# Patient Record
Sex: MALE | Race: WHITE | NOT HISPANIC OR LATINO | Employment: OTHER | URBAN - METROPOLITAN AREA
[De-identification: names, ages, dates, MRNs, and addresses within clinical notes are randomized per-mention and may not be internally consistent; named-entity substitution may affect disease eponyms.]

---

## 2021-08-11 ENCOUNTER — APPOINTMENT (OUTPATIENT)
Dept: GENERAL RADIOLOGY | Facility: CLINIC | Age: 41
End: 2021-08-11
Attending: EMERGENCY MEDICINE
Payer: MEDICARE

## 2021-08-11 ENCOUNTER — HOSPITAL ENCOUNTER (EMERGENCY)
Facility: CLINIC | Age: 41
Discharge: HOME | End: 2021-08-11
Attending: EMERGENCY MEDICINE | Admitting: EMERGENCY MEDICINE
Payer: MEDICARE

## 2021-08-11 ENCOUNTER — NURSE TRIAGE (OUTPATIENT)
Dept: NURSING | Facility: CLINIC | Age: 41
End: 2021-08-11

## 2021-08-11 VITALS
HEART RATE: 77 BPM | WEIGHT: 175 LBS | RESPIRATION RATE: 20 BRPM | SYSTOLIC BLOOD PRESSURE: 126 MMHG | TEMPERATURE: 97.9 F | DIASTOLIC BLOOD PRESSURE: 89 MMHG | OXYGEN SATURATION: 97 %

## 2021-08-11 DIAGNOSIS — I48.91 ATRIAL FIBRILLATION WITH RAPID VENTRICULAR RESPONSE (H): ICD-10-CM

## 2021-08-11 LAB
ANION GAP SERPL CALCULATED.3IONS-SCNC: 7 MMOL/L (ref 3–14)
ATRIAL RATE - MUSE: 64 BPM
ATRIAL RATE - MUSE: 73 BPM
BASOPHILS # BLD AUTO: 0.1 10E3/UL (ref 0–0.2)
BASOPHILS NFR BLD AUTO: 1 %
BUN SERPL-MCNC: 14 MG/DL (ref 7–30)
CALCIUM SERPL-MCNC: 9.4 MG/DL (ref 8.5–10.1)
CHLORIDE BLD-SCNC: 105 MMOL/L (ref 94–109)
CO2 SERPL-SCNC: 24 MMOL/L (ref 20–32)
CREAT SERPL-MCNC: 0.96 MG/DL (ref 0.66–1.25)
DIASTOLIC BLOOD PRESSURE - MUSE: NORMAL MMHG
DIASTOLIC BLOOD PRESSURE - MUSE: NORMAL MMHG
EOSINOPHIL # BLD AUTO: 0.1 10E3/UL (ref 0–0.7)
EOSINOPHIL NFR BLD AUTO: 1 %
ERYTHROCYTE [DISTWIDTH] IN BLOOD BY AUTOMATED COUNT: 12.3 % (ref 10–15)
GFR SERPL CREATININE-BSD FRML MDRD: >90 ML/MIN/1.73M2
GLUCOSE BLD-MCNC: 79 MG/DL (ref 70–99)
HCT VFR BLD AUTO: 49.6 % (ref 40–53)
HGB BLD-MCNC: 16.5 G/DL (ref 13.3–17.7)
IMM GRANULOCYTES # BLD: 0 10E3/UL
IMM GRANULOCYTES NFR BLD: 0 %
INTERPRETATION ECG - MUSE: NORMAL
INTERPRETATION ECG - MUSE: NORMAL
LYMPHOCYTES # BLD AUTO: 2.3 10E3/UL (ref 0.8–5.3)
LYMPHOCYTES NFR BLD AUTO: 25 %
MCH RBC QN AUTO: 32.1 PG (ref 26.5–33)
MCHC RBC AUTO-ENTMCNC: 33.3 G/DL (ref 31.5–36.5)
MCV RBC AUTO: 97 FL (ref 78–100)
MONOCYTES # BLD AUTO: 0.7 10E3/UL (ref 0–1.3)
MONOCYTES NFR BLD AUTO: 8 %
NEUTROPHILS # BLD AUTO: 5.9 10E3/UL (ref 1.6–8.3)
NEUTROPHILS NFR BLD AUTO: 65 %
NRBC # BLD AUTO: 0 10E3/UL
NRBC BLD AUTO-RTO: 0 /100
P AXIS - MUSE: 46 DEGREES
P AXIS - MUSE: NORMAL DEGREES
PLATELET # BLD AUTO: 171 10E3/UL (ref 150–450)
POTASSIUM BLD-SCNC: 4.3 MMOL/L (ref 3.4–5.3)
PR INTERVAL - MUSE: 184 MS
PR INTERVAL - MUSE: NORMAL MS
QRS DURATION - MUSE: 102 MS
QRS DURATION - MUSE: 104 MS
QT - MUSE: 314 MS
QT - MUSE: 368 MS
QTC - MUSE: 405 MS
QTC - MUSE: 480 MS
R AXIS - MUSE: -64 DEGREES
R AXIS - MUSE: -77 DEGREES
RBC # BLD AUTO: 5.14 10E6/UL (ref 4.4–5.9)
SODIUM SERPL-SCNC: 136 MMOL/L (ref 133–144)
SYSTOLIC BLOOD PRESSURE - MUSE: NORMAL MMHG
SYSTOLIC BLOOD PRESSURE - MUSE: NORMAL MMHG
T AXIS - MUSE: 20 DEGREES
T AXIS - MUSE: 46 DEGREES
TROPONIN I SERPL-MCNC: <0.015 UG/L (ref 0–0.04)
TSH SERPL DL<=0.005 MIU/L-ACNC: 1.01 MU/L (ref 0.4–4)
VENTRICULAR RATE- MUSE: 141 BPM
VENTRICULAR RATE- MUSE: 73 BPM
WBC # BLD AUTO: 9 10E3/UL (ref 4–11)

## 2021-08-11 PROCEDURE — 92960 CARDIOVERSION ELECTRIC EXT: CPT

## 2021-08-11 PROCEDURE — 93005 ELECTROCARDIOGRAM TRACING: CPT | Mod: 76

## 2021-08-11 PROCEDURE — 85025 COMPLETE CBC W/AUTO DIFF WBC: CPT | Performed by: EMERGENCY MEDICINE

## 2021-08-11 PROCEDURE — 80048 BASIC METABOLIC PNL TOTAL CA: CPT | Performed by: EMERGENCY MEDICINE

## 2021-08-11 PROCEDURE — 258N000003 HC RX IP 258 OP 636: Performed by: EMERGENCY MEDICINE

## 2021-08-11 PROCEDURE — 84443 ASSAY THYROID STIM HORMONE: CPT | Performed by: EMERGENCY MEDICINE

## 2021-08-11 PROCEDURE — 99285 EMERGENCY DEPT VISIT HI MDM: CPT | Mod: 25

## 2021-08-11 PROCEDURE — 96361 HYDRATE IV INFUSION ADD-ON: CPT

## 2021-08-11 PROCEDURE — 71045 X-RAY EXAM CHEST 1 VIEW: CPT

## 2021-08-11 PROCEDURE — 36415 COLL VENOUS BLD VENIPUNCTURE: CPT | Performed by: EMERGENCY MEDICINE

## 2021-08-11 PROCEDURE — 96360 HYDRATION IV INFUSION INIT: CPT

## 2021-08-11 PROCEDURE — 84484 ASSAY OF TROPONIN QUANT: CPT | Performed by: EMERGENCY MEDICINE

## 2021-08-11 PROCEDURE — 250N000011 HC RX IP 250 OP 636

## 2021-08-11 PROCEDURE — 93005 ELECTROCARDIOGRAM TRACING: CPT

## 2021-08-11 RX ORDER — PROPOFOL 10 MG/ML
INJECTION, EMULSION INTRAVENOUS
Status: COMPLETED
Start: 2021-08-11 | End: 2021-08-11

## 2021-08-11 RX ORDER — PROPOFOL 10 MG/ML
0.1 INJECTION, EMULSION INTRAVENOUS
Status: DISCONTINUED | OUTPATIENT
Start: 2021-08-11 | End: 2021-08-11 | Stop reason: HOSPADM

## 2021-08-11 RX ORDER — PROPOFOL 10 MG/ML
1 INJECTION, EMULSION INTRAVENOUS ONCE
Status: COMPLETED | OUTPATIENT
Start: 2021-08-11 | End: 2021-08-11

## 2021-08-11 RX ORDER — ASPIRIN 325 MG
325 TABLET ORAL DAILY
Qty: 30 TABLET | Refills: 0 | Status: SHIPPED | OUTPATIENT
Start: 2021-08-11 | End: 2021-09-10

## 2021-08-11 RX ADMIN — PROPOFOL 150 MG: 10 INJECTION, EMULSION INTRAVENOUS at 13:28

## 2021-08-11 RX ADMIN — SODIUM CHLORIDE, POTASSIUM CHLORIDE, SODIUM LACTATE AND CALCIUM CHLORIDE 1000 ML: 600; 310; 30; 20 INJECTION, SOLUTION INTRAVENOUS at 13:08

## 2021-08-11 ASSESSMENT — ENCOUNTER SYMPTOMS
CHEST TIGHTNESS: 1
SHORTNESS OF BREATH: 0
NAUSEA: 1
PALPITATIONS: 1
DIZZINESS: 1

## 2021-08-11 NOTE — SEDATION DOCUMENTATION
Pt tolerated procedure well. A&O post procedure. Back to NSR at 71. On RA, O2 sats at 97%. Denies any new complaints. Report given to primary RN.

## 2021-08-11 NOTE — ED TRIAGE NOTES
Pt presents to ED with c/o high heart rate, chest pain, lightheaded, short of breath since 930 this morning. Denies hx heart problems. ABC Intact. Pulse fast and irregular in triage.

## 2021-08-11 NOTE — ED PROVIDER NOTES
History   Chief Complaint:  Chest Pain and Palpitations       The history is provided by the patient.      Tonny Giordano is a 41 year old male with history of anxiety who presents with chest pain and palpitations. Tonny woke up this morning at 5AM and developed sudden onset feelings of tachycardia, heart fluttering, and chest tightness at 6AM. Symptoms have been constant since onset and are mildly worsened in the ED. This feels similar to past panic attacks. He reports associated dizziness and nausea. No shortness of breath. He mentions he quit smoking for a year but had a cigarette this morning. He smokes marijuana but denies other drug or stimulant use. He denies any other health problems in the past and no other ongoing medical conditions or recent illness.    Review of Systems   Respiratory: Positive for chest tightness. Negative for shortness of breath.    Cardiovascular: Positive for chest pain and palpitations.   Gastrointestinal: Positive for nausea.   Neurological: Positive for dizziness.   All other systems reviewed and are negative.      Allergies:  No Known Allergies    Medications:  Clonazepam  Lamotrigine  Lexapro     Past Medical History:    Anxiety  Depressive disorder  Hiatal hernia  Somatization disorder  Panic disorder without agoraphobia  Leucocytosis  PTSD  Tobacco use     Family History:    Cancer: father (esophageal)    Social History:  Smoking status: yes  Alcohol use: yes  PCP: Torin Perez MD  Presents to the ED alone.      Physical Exam     Patient Vitals for the past 24 hrs:   BP Temp Temp src Pulse Resp SpO2 Weight   08/11/21 1500 126/89 -- -- 77 -- 97 % --   08/11/21 1459 -- -- -- 77 -- 97 % --   08/11/21 1445 (!) 132/91 -- -- 82 -- 97 % --   08/11/21 1430 129/85 -- -- 77 -- 96 % --   08/11/21 1415 119/87 -- -- (!) 158 -- 97 % --   08/11/21 1400 122/85 -- -- 77 -- 96 % --   08/11/21 1345 119/84 -- -- 77 -- 96 % --   08/11/21 1344 -- -- -- -- -- 96 % --   08/11/21 1335 111/67  -- -- 82 20 98 % --   08/11/21 1330 106/64 -- -- 72 22 97 % --   08/11/21 1319 (!) 130/108 97.9  F (36.6  C) Oral (!) 129 21 99 % --   08/11/21 1314 -- -- -- -- -- -- 79.4 kg (175 lb)   08/11/21 1245 (!) 127/96 -- -- (!) 132 -- 100 % --   08/11/21 1141 (!) 146/100 97  F (36.1  C) Temporal (!) 155 26 100 % --       Physical Exam      Eyes:    Conjunctiva normal  Neck:    Supple, no meningismus.     CV:     Tachycardic, irregular rhythm.      No murmurs, rubs or gallops.       No unilateral leg swelling.       2+ radial pulses bilateral.       No lower extremity edema.  PULM:    Clear to auscultation bilateral.       No respiratory distress.      Good air exchange.     No rales or wheezing.     No stridor.  ABD:    Soft, non-tender, non-distended.       No pulsatile masses.       No rebound, guarding or rigidity.  MSK:     No gross deformity to all four extremities.   LYMPH:   No cervical lymphadenopathy.  NEURO:   Alert. Good muscle tone, no atrophy.  Skin:    Warm, dry and intact.    Psych:    Mood is good and affect is appropriate.          Emergency Department Course   ECG:  ECG taken at 1145, ECG read at 1242  Atrial fibrillation with rapid ventricular response  Left axis deviation  Abnormal ECG  Rate 141 bpm. HI interval * ms. QRS duration 104 ms. QT/QTc 314/480 ms. P-R-T axes * -77 46.    ECG taken at 1331, ECG read at 1332  Normal sinus rhythm  Left axis deviation  Incomplete right bundle branch block  Abnormal ECG  Rate 73 bpm. HI interval 184 ms. QRS duration 102 ms. QT/QTc 368/405 ms. P-R-T axes 46 -64 20.    Imaging:  XR Chest port 1  Negative chest.   Reading as per radiology    Laboratory:  CBC: WBC 9.0, HGB 16.5,    BMP: Glucose 79, o/w WNL (Creatinine: 0.96)      Troponin (Collected 1248): <0.015  T4 Free: 1.01    [facility]    -Cardioversion External    Date/Time: 8/11/2021 1:21 PM  Performed by: Roger Santana MD  Authorized by: Roger Santana MD     ED  EVALUATION:      I have performed an Emergency Department Evaluation including taking a history and physical examination, this evaluation will be documented in the electronic medical record for this ED encounter.      ASA Class: Class 1- healthy patient    Mallampati: Grade 1- soft palate, uvula, tonsillar pillars, and posterior pharyngeal wall visible    NPO Status: appropriately NPO for procedure  UNIVERSAL PROTOCOL   Site Marked: NA  Prior Images Obtained and Reviewed:  NA  Required items: Required blood products, implants, devices and special equipment available    Patient identity confirmed:  Verbally with patient, arm band, provided demographic data and hospital-assigned identification number  Patient was reevaluated immediately before administering moderate or deep sedation or anesthesia  Confirmation Checklist:  Patient's identity using two indicators, relevant allergies, procedure was appropriate and matched the consent or emergent situation and correct equipment/implants were available  Time out: Immediately prior to the procedure a time out was called    Universal Protocol: the Joint Commission Universal Protocol was followed    Preparation: Patient was prepped and draped in usual sterile fashion          SEDATION    Patient Sedated: Yes    Sedation Type:  Deep  Sedation:  Propofol  Vital signs: Vital signs monitored during sedation      PRE-PROCEDURE DETAILS:     Cardioversion basis:  Elective    Rhythm:  Atrial fibrillation    Electrode placement:  Anterior-posterior  Attempt one:     Cardioversion mode:  Synchronous    Waveform:  Biphasic    Shock (Joules):  200    Shock outcome:  Conversion to normal sinus rhythm  Post-procedure details:     Patient status:  Awake    PROCEDURE   Patient Tolerance:  Patient tolerated the procedure well with no immediate complications    Length of time physician/provider present for 1:1 monitoring during sedation: 10      Emergency Department Course:  Reviewed:  I  reviewed the patient's nursing notes, vitals, past medical records, Care Everywhere.     Assessments:  1243 I performed an assessment and examination of the patient as noted above.    1320 I performed cardioversion procedure.     Interventions:  1308 Lactated ringers bolus, 1L, IV  1328 Propofol, 150 mg, IV    Disposition:  The patient was discharged to home.       Impression & Plan   Medical Decision Making:    Tonny Giordano is a 41 year old male who presented to the ED with abrupt onset of palpitations, tachycardia and chest tightness this morning.  He was found to be in novel atrial fibrillation with rapid ventricular response.  Patients symptoms began within the last 12 hours and after prolonged discussion of rhythm versus rate control, patient opted for rhythm control with electrical cardioversion.  He underwent conscious sedation and was converted with a single synchronized cardioversion at 200 J.  He is maintaining normal sinus rhythm since cardioversion.  He has no electrolyte disturbance, thyroid dysfunction or evidence of coronary ischemia to induce the atrial fibrillation.  He has a CHADS-Vasc score of 0 thus placed on full dose aspirin alone.  Patient was counseled on reducing/cutting out all stimulant use including nicotine.  Close follow-up with primary care physician and return to the ED for any worsening symptoms.      Covid-19  Tonny Giordano was evaluated during a global COVID-19 pandemic, which necessitated consideration that the patient might be at risk for infection with the SARS-CoV-2 virus that causes COVID-19.   Applicable protocols for evaluation were followed during the patient's care.   COVID-19 was considered as part of the patient's evaluation.     Diagnosis:    ICD-10-CM    1. Atrial fibrillation with rapid ventricular response (H)  I48.91        Discharge Medications:  Discharge Medication List as of 8/11/2021  3:16 PM      START taking these medications    Details   aspirin (ASA) 325  MG tablet Take 1 tablet (325 mg) by mouth daily, Disp-30 tablet, R-0, Local Print             Scribe Disclosure:  I, Lennox Barrientos (trainee) and Marylou Franco (), am serving as a scribe at 12:43 PM on 8/11/2021 to document services personally performed by Roger Santana MD based on my observations and the provider's statements to me.       Roger Santana MD  08/13/21 0701

## 2021-08-11 NOTE — TELEPHONE ENCOUNTER
Tonny calls and says that he was just discharged from the [facility] ER, for A-Fib, and says that he needs to speak to the ER Dr. Pt. Says that the  Prescribed ASA for him and says that he did research on this and does not think it is effective for the A-Fib. Tonny wants to speak to that ER Dr. LUIS then gave pt. The phone # to that ER and pt. Says that he will call that # now. COVID 19 Nurse Triage Plan/Patient Instructions    Please be aware that novel coronavirus (COVID-19) may be circulating in the community. If you develop symptoms such as fever, cough, or SOB or if you have concerns about the presence of another infection including coronavirus (COVID-19), please contact your health care provider or visit [email or web address].     Disposition/Instructions    Home care recommended. Follow home care protocol based instructions.    Thank you for taking steps to prevent the spread of this virus.  o Limit your contact with others.  o Wear a simple mask to cover your cough.  o Wash your hands well and often.    Resources    [facility]: About COVID-19: [email or web address]    CDC: What to Do If You're Sick: www.cdc.gov/coronavirus/2019-ncov/about/steps-when-sick.html    CDC: Ending Home Isolation: www.cdc.gov/coronavirus/2019-ncov/hcp/disposition-in-home-patients.html     CDC: Caring for Someone: www.cdc.gov/coronavirus/2019-ncov/if-you-are-sick/care-for-someone.html     [facility]: Interim Guidance for Hospital Discharge to Home: www.health.[facility].[place]/diseases/coronavirus/hcp/hospdischarge.pdf    [facility] clinical trials (COVID-19 research studies): [email or web address].[facility]-clinical-trials     Below are the COVID-19 hotlines at the [place] [facility] of [facility] ([facility]). Interpreters are available.   o For health questions: Call 731-794-0217 or 1-715.616.2247 (7 a.m. to 7 p.m.)  o For questions about schools and childcare: Call 402-153-8871 or 1-633.556.8493 (7 a.m. to 7  p.m.)

## 2021-12-12 ENCOUNTER — HOSPITAL ENCOUNTER (EMERGENCY)
Facility: CLINIC | Age: 41
Discharge: HOME | End: 2021-12-13
Attending: EMERGENCY MEDICINE | Admitting: EMERGENCY MEDICINE
Payer: MEDICARE

## 2021-12-12 ENCOUNTER — NURSE TRIAGE (OUTPATIENT)
Dept: NURSING | Facility: CLINIC | Age: 41
End: 2021-12-12
Payer: MEDICARE

## 2021-12-12 DIAGNOSIS — I48.92 PAROXYSMAL ATRIAL FLUTTER (H): ICD-10-CM

## 2021-12-12 LAB
ANION GAP SERPL CALCULATED.3IONS-SCNC: 6 MMOL/L (ref 3–14)
BASOPHILS # BLD AUTO: 0.1 10E3/UL (ref 0–0.2)
BASOPHILS NFR BLD AUTO: 1 %
BUN SERPL-MCNC: 15 MG/DL (ref 7–30)
CALCIUM SERPL-MCNC: 8.9 MG/DL (ref 8.5–10.1)
CHLORIDE BLD-SCNC: 109 MMOL/L (ref 94–109)
CO2 SERPL-SCNC: 26 MMOL/L (ref 20–32)
CREAT SERPL-MCNC: 1.04 MG/DL (ref 0.66–1.25)
EOSINOPHIL # BLD AUTO: 0.2 10E3/UL (ref 0–0.7)
EOSINOPHIL NFR BLD AUTO: 3 %
ERYTHROCYTE [DISTWIDTH] IN BLOOD BY AUTOMATED COUNT: 11.9 % (ref 10–15)
GFR SERPL CREATININE-BSD FRML MDRD: 89 ML/MIN/1.73M2
GLUCOSE BLD-MCNC: 93 MG/DL (ref 70–99)
HCT VFR BLD AUTO: 50.1 % (ref 40–53)
HGB BLD-MCNC: 16.4 G/DL (ref 13.3–17.7)
IMM GRANULOCYTES # BLD: 0 10E3/UL
IMM GRANULOCYTES NFR BLD: 0 %
LYMPHOCYTES # BLD AUTO: 4 10E3/UL (ref 0.8–5.3)
LYMPHOCYTES NFR BLD AUTO: 52 %
MAGNESIUM SERPL-MCNC: 2.2 MG/DL (ref 1.6–2.3)
MCH RBC QN AUTO: 30.9 PG (ref 26.5–33)
MCHC RBC AUTO-ENTMCNC: 32.7 G/DL (ref 31.5–36.5)
MCV RBC AUTO: 95 FL (ref 78–100)
MONOCYTES # BLD AUTO: 0.8 10E3/UL (ref 0–1.3)
MONOCYTES NFR BLD AUTO: 11 %
NEUTROPHILS # BLD AUTO: 2.5 10E3/UL (ref 1.6–8.3)
NEUTROPHILS NFR BLD AUTO: 33 %
NRBC # BLD AUTO: 0 10E3/UL
NRBC BLD AUTO-RTO: 0 /100
PLATELET # BLD AUTO: 184 10E3/UL (ref 150–450)
POTASSIUM BLD-SCNC: 4.4 MMOL/L (ref 3.4–5.3)
RBC # BLD AUTO: 5.3 10E6/UL (ref 4.4–5.9)
SODIUM SERPL-SCNC: 141 MMOL/L (ref 133–144)
TROPONIN I SERPL HS-MCNC: 5 NG/L
TSH SERPL DL<=0.005 MIU/L-ACNC: 1.85 MU/L (ref 0.4–4)
WBC # BLD AUTO: 7.7 10E3/UL (ref 4–11)

## 2021-12-12 PROCEDURE — 36415 COLL VENOUS BLD VENIPUNCTURE: CPT | Performed by: EMERGENCY MEDICINE

## 2021-12-12 PROCEDURE — 85025 COMPLETE CBC W/AUTO DIFF WBC: CPT | Performed by: EMERGENCY MEDICINE

## 2021-12-12 PROCEDURE — 250N000013 HC RX MED GY IP 250 OP 250 PS 637: Performed by: EMERGENCY MEDICINE

## 2021-12-12 PROCEDURE — 83735 ASSAY OF MAGNESIUM: CPT | Performed by: EMERGENCY MEDICINE

## 2021-12-12 PROCEDURE — 93005 ELECTROCARDIOGRAM TRACING: CPT

## 2021-12-12 PROCEDURE — 84443 ASSAY THYROID STIM HORMONE: CPT | Performed by: EMERGENCY MEDICINE

## 2021-12-12 PROCEDURE — 99284 EMERGENCY DEPT VISIT MOD MDM: CPT | Mod: 25

## 2021-12-12 PROCEDURE — 258N000003 HC RX IP 258 OP 636: Performed by: EMERGENCY MEDICINE

## 2021-12-12 PROCEDURE — 96360 HYDRATION IV INFUSION INIT: CPT

## 2021-12-12 PROCEDURE — 84484 ASSAY OF TROPONIN QUANT: CPT | Performed by: EMERGENCY MEDICINE

## 2021-12-12 PROCEDURE — 93005 ELECTROCARDIOGRAM TRACING: CPT | Mod: 76

## 2021-12-12 PROCEDURE — 80048 BASIC METABOLIC PNL TOTAL CA: CPT | Performed by: EMERGENCY MEDICINE

## 2021-12-12 RX ORDER — LAMOTRIGINE 200 MG/1
200 TABLET ORAL ONCE
Status: COMPLETED | OUTPATIENT
Start: 2021-12-12 | End: 2021-12-12

## 2021-12-12 RX ORDER — CLONAZEPAM 0.5 MG/1
2 TABLET ORAL ONCE
Status: COMPLETED | OUTPATIENT
Start: 2021-12-12 | End: 2021-12-12

## 2021-12-12 RX ADMIN — SODIUM CHLORIDE 1000 ML: 9 INJECTION, SOLUTION INTRAVENOUS at 23:33

## 2021-12-12 RX ADMIN — LAMOTRIGINE 200 MG: 200 TABLET ORAL at 23:33

## 2021-12-12 RX ADMIN — CLONAZEPAM 2 MG: 0.5 TABLET ORAL at 23:33

## 2021-12-12 ASSESSMENT — ENCOUNTER SYMPTOMS
PALPITATIONS: 1
SHORTNESS OF BREATH: 1
LIGHT-HEADEDNESS: 1
DIZZINESS: 1

## 2021-12-13 VITALS
SYSTOLIC BLOOD PRESSURE: 116 MMHG | RESPIRATION RATE: 12 BRPM | DIASTOLIC BLOOD PRESSURE: 88 MMHG | TEMPERATURE: 97.8 F | OXYGEN SATURATION: 97 % | HEART RATE: 91 BPM

## 2021-12-13 LAB
ATRIAL RATE - MUSE: 278 BPM
ATRIAL RATE - MUSE: 293 BPM
DIASTOLIC BLOOD PRESSURE - MUSE: NORMAL MMHG
DIASTOLIC BLOOD PRESSURE - MUSE: NORMAL MMHG
HOLD SPECIMEN: NORMAL
INTERPRETATION ECG - MUSE: NORMAL
INTERPRETATION ECG - MUSE: NORMAL
P AXIS - MUSE: NORMAL DEGREES
P AXIS - MUSE: NORMAL DEGREES
PR INTERVAL - MUSE: NORMAL MS
PR INTERVAL - MUSE: NORMAL MS
QRS DURATION - MUSE: 108 MS
QRS DURATION - MUSE: 110 MS
QT - MUSE: 354 MS
QT - MUSE: 366 MS
QTC - MUSE: 413 MS
QTC - MUSE: 430 MS
R AXIS - MUSE: -49 DEGREES
R AXIS - MUSE: -66 DEGREES
SYSTOLIC BLOOD PRESSURE - MUSE: NORMAL MMHG
SYSTOLIC BLOOD PRESSURE - MUSE: NORMAL MMHG
T AXIS - MUSE: 38 DEGREES
T AXIS - MUSE: 43 DEGREES
VENTRICULAR RATE- MUSE: 82 BPM
VENTRICULAR RATE- MUSE: 83 BPM

## 2021-12-13 NOTE — ED TRIAGE NOTES
"Patient with cardiac history. Started having palpitations/  \"fluttery\" feeling around 1700 so he took his Metoprolol and laid down. Patient laid down for about 3 hours and when he woke up he was experiencing the same palpitations, lightheadedness and dizziness. Patient states symptoms are continuing to worsen. When asked if patient is experiencing chest pain patient states \"if I am its a very very very very mild dull ache\". ABCs intact.   "

## 2021-12-13 NOTE — TELEPHONE ENCOUNTER
"Patient called and he is having dizziness and lightheadedness. Patient  had afib back in August and this feels somewhat like that. Patient had a external cardioversion.  He stated he was given\" metroponol\".  He instruction was to take this medication if he experiences symptoms and lay down.  He did and tried to rest.  His symptoms were still present.    He stated his heart rate is around 80.  He denies any SOB.    Per protocol he should be seen in the ED.  He will get a ride and go there now.    Tammy Dye RN   12/12/21 9:25 PM  [facility] Nurse Advisor    Reason for Disposition    Dizziness, lightheadedness, or weakness    Additional Information    Negative: Passed out (i.e., lost consciousness, collapsed and was not responding)    Negative: Shock suspected (e.g., cold/pale/clammy skin, too weak to stand, low BP, rapid pulse)    Negative: Difficult to awaken or acting confused (e.g., disoriented, slurred speech)    Negative: Visible sweat on face or sweat dripping down face    Negative: Unable to walk, or can only walk with assistance (e.g., requires support)    Negative: [1] Received SHOCK from implantable cardiac defibrillator AND [2] persisting symptoms (i.e., palpitations, lightheadedness)    Negative: [1] Dizziness, lightheadedness, or weakness AND [2] heart beating very rapidly (e.g., > 140 / minute)    Negative: [1] Dizziness, lightheadedness, or weakness AND [2] heart beating very slowly  (e.g., < 50 / minute)    Negative: Sounds like a life-threatening emergency to the triager    Negative: Chest pain    Negative: Difficulty breathing    Protocols used: HEART RATE AND HEARTBEAT [id number]      "

## 2021-12-13 NOTE — ED PROVIDER NOTES
"  History   Chief Complaint:  Palpitations       HPI   Tonny Giordano is a 41 year old male with history of bicuspid aortic valve, atrial fibrillation, and panic disorder who presents with palpitations. The patient began having palpations and a \"fluttery\" feeing around 1700 this evening so her took his Metoprolol and laid down. When he woke up, he was experiencing palpitations, lightheadedness, and dizziness. He also reports feeling a racing heart rate and irregular rhythm yesterday but those symptoms resolved last night. He notes intermittent shortness of breath and seconds of chest pain as well, but describes these symptoms as mild and believes they could be due to his anxiety and panic disorder.  He denies fever chills.  Denies cough or congestion.      Review of Systems   Respiratory: Positive for shortness of breath.    Cardiovascular: Positive for chest pain and palpitations.   Neurological: Positive for dizziness and light-headedness.   All other systems reviewed and are negative.      Allergies:  Codeine  Prochlorperazine  Methadone    Medications:  Promethazine  Citalopram  Doxepin  Finasteride  Lamotrigine  Lamictal  Klonopin  Metoprolol tartrate  Flexeril    Past Medical History:     Anxiety  Depression  Smoker  Hiatal hernia  Somatization disorder  Panic disorder  Leucocytosis  Nausea with vomiting  Constipation  Metabolic acidosis  Leukocytosis  Bicuspid aortic valve  Thoracic aortic aneurysm without rupture  Atrial fibrillation with RVR  Posttraumatic stress disorder  Tenosynovitis, de Quervain  Lipoma of skin  Alopecia  Elevated prolactin level  Tobacco use    Past Surgical History:    [place] teeth extraction    Family History:    Father: cancer    Social History:  Presents alone.        Physical Exam     Patient Vitals for the past 24 hrs:   BP Temp Temp src Pulse Resp SpO2   12/13/21 0130 -- -- -- 91 12 97 %   12/13/21 0115 116/88 -- -- 90 16 96 %   12/13/21 0100 (!) 114/104 -- -- 91 16 96 % "   12/13/21 0045 112/82 -- -- -- -- --   12/13/21 0030 116/89 -- -- 76 10 97 %   12/13/21 0015 (!) 116/93 -- -- 73 12 96 %   12/13/21 0000 110/87 -- -- 76 11 96 %   12/12/21 2345 100/82 -- -- 92 11 96 %   12/12/21 2335 93/69 -- -- 96 14 96 %   12/12/21 2315 110/77 -- -- 96 10 95 %   12/12/21 2300 (!) 112/90 -- -- 72 11 96 %   12/12/21 2156 (!) 124/97 97.8  F (36.6  C) Temporal 98 18 100 %       Physical Exam  General: Adult male, mildly anxious appearing, sitting upright  Eyes: PERRL, Conjunctive within normal limits  ENT: Moist mucous membranes, oropharynx clear.   Neck: No appreciable thyromegaly.  CV: Normal S1S2, no murmur, rub or gallop.  Irregular.  Resp: Clear to auscultation bilaterally, no wheezes, rales or rhonchi. Normal respiratory effort.  GI: Abdomen is soft, nontender and nondistended. No palpable masses. No rebound or guarding.  MSK: No edema. Nontender. Normal active range of motion.  Skin: Warm and dry. No rashes or lesions or ecchymoses on visible skin.  Neuro: Alert and oriented. Responds appropriately to all questions and commands. No focal findings appreciated. Normal muscle tone.  Psych: Mildly anxious.  Emergency Department Course   ECG#1  ECG obtained at 2204, ECG read at 2249  Atrial flutter with variable AV block  Left axis deviation  Incomplete right bundle branch block  Nonspecific ST abnormality  Abnormal ECG  Rate 83 bpm. NC interval * ms. QRS duration 110 ms. QT/QTc 366/430 ms. P-R-T axes * -66 43.     ECG #2  ECG taken at 0102, ECG read at 0102  Atrial flutter with variable AV block  Left axis deviation  Abnormal ECG  Rate 82 bpm. NC interval * ms. QRS duration 108 ms. QT/QTc 354/413 ms. P-R-T axes * -49 38.     Laboratory:  Labs Ordered and Resulted from Time of ED Arrival to Time of ED Departure   TROPONIN I - Normal       Result Value    Troponin I High Sensitivity 5     BASIC METABOLIC PANEL - Normal    Sodium 141      Potassium 4.4      Chloride 109      Carbon Dioxide (CO2) 26       Anion Gap 6      Urea Nitrogen 15      Creatinine 1.04      Calcium 8.9      Glucose 93      GFR Estimate 89     MAGNESIUM - Normal    Magnesium 2.2     TSH WITH FREE T4 REFLEX - Normal    TSH 1.85     CBC WITH PLATELETS AND DIFFERENTIAL    WBC Count 7.7      RBC Count 5.30      Hemoglobin 16.4      Hematocrit 50.1      MCV 95      MCH 30.9      MCHC 32.7      RDW 11.9      Platelet Count 184      % Neutrophils 33      % Lymphocytes 52      % Monocytes 11      % Eosinophils 3      % Basophils 1      % Immature Granulocytes 0      NRBCs per 100 WBC 0      Absolute Neutrophils 2.5      Absolute Lymphocytes 4.0      Absolute Monocytes 0.8      Absolute Eosinophils 0.2      Absolute Basophils 0.1      Absolute Immature Granulocytes 0.0      Absolute NRBCs 0.0          Emergency Department Course:     Reviewed:  I reviewed nursing notes, vitals, past medical history and Care Everywhere    Assessments:  2252 I obtained history and examined the patient as noted above.    0028   the patient notes he is feeling improved.    0126 I rechecked the patient and explained findings. The patient would like to return home.    Consults:  0047 I spoke with Dr. Esparza, Cardiologist, regarding the patient's presentation.    Interventions:  2333 0.9% sodium chloride 1000 mL IV    2333 Clonazepam 2 mg PO    2333 Lamotrigine 200 mg PO    Disposition:  The patient was discharged to home.     Impression & Plan     Medical Decision Making:  Tonny Giordano is a 41-year-old male with history of anxiety as well as atrial fibrillation who presents emergency department with concerns for palpitations earlier in the evening.  Currently is denying any other symptoms.  After observation here in the emergency department, he noted his symptoms had improved.  This was with administration of his baseline medications.  He remained in rate controlled atrial flutter throughout his stay.  Is unclear how long he has been in this rhythm, but suspicious for more  than 24 to 48 hours.  No indication for emergent cardioversion.  He has a CHADS VASC score of 0.  He is asymptomatic on reassessment.  No indication for anticoagulation or admission emergently, rather close follow-up with cardiology this week.  He felt comfortable this plan.  Was discharged home in improved condition.      Diagnosis:    ICD-10-CM    1. Paroxysmal atrial flutter (H)  I48.92        Discharge Medications:  Discharge Medication List as of 12/13/2021  1:28 AM          Scribe Disclosure:  Alpesh ORTIZ, am serving as a scribe at 10:47 PM on 12/12/2021 to document services personally performed by Monica Allen MD based on my observations and the provider's statements to me.          Monica Allen MD  12/13/21 0435

## 2023-05-25 ENCOUNTER — NURSE TRIAGE (OUTPATIENT)
Dept: NURSING | Facility: CLINIC | Age: 43
End: 2023-05-25
Payer: MEDICARE

## 2023-05-26 ENCOUNTER — HOSPITAL ENCOUNTER (EMERGENCY)
Facility: CLINIC | Age: 43
Discharge: HOME | End: 2023-05-26
Attending: EMERGENCY MEDICINE | Admitting: EMERGENCY MEDICINE
Payer: MEDICARE

## 2023-05-26 VITALS
TEMPERATURE: 98.9 F | SYSTOLIC BLOOD PRESSURE: 139 MMHG | DIASTOLIC BLOOD PRESSURE: 100 MMHG | HEART RATE: 89 BPM | OXYGEN SATURATION: 99 % | RESPIRATION RATE: 18 BRPM
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
## 2023-05-26 DIAGNOSIS — S60.512A CAT SCRATCH OF LEFT HAND, INITIAL ENCOUNTER: ICD-10-CM

## 2023-05-26 PROCEDURE — 250N000011 HC RX IP 250 OP 636: Performed by: EMERGENCY MEDICINE

## 2023-05-26 PROCEDURE — 90471 IMMUNIZATION ADMIN: CPT | Performed by: EMERGENCY MEDICINE

## 2023-05-26 PROCEDURE — 99284 EMERGENCY DEPT VISIT MOD MDM: CPT | Mod: 25

## 2023-05-26 PROCEDURE — 90715 TDAP VACCINE 7 YRS/> IM: CPT | Performed by: EMERGENCY MEDICINE

## 2023-05-26 PROCEDURE — 250N000013 HC RX MED GY IP 250 OP 250 PS 637: Performed by: EMERGENCY MEDICINE

## 2023-05-26 RX ORDER — METRONIDAZOLE 500 MG/1
500 TABLET ORAL 3 TIMES DAILY
Qty: 21 TABLET | Refills: 0 | Status: SHIPPED | OUTPATIENT
Start: 2023-05-26 | End: 2023-06-02

## 2023-05-26 RX ORDER — CEFUROXIME AXETIL 250 MG/1
500 TABLET ORAL ONCE
Status: COMPLETED | OUTPATIENT
Start: 2023-05-26 | End: 2023-05-26

## 2023-05-26 RX ORDER — METRONIDAZOLE 500 MG/1
500 TABLET ORAL ONCE
Status: COMPLETED | OUTPATIENT
Start: 2023-05-26 | End: 2023-05-26

## 2023-05-26 RX ORDER — CEFUROXIME AXETIL 500 MG/1
500 TABLET ORAL 2 TIMES DAILY
Qty: 14 TABLET | Refills: 0 | Status: SHIPPED | OUTPATIENT
Start: 2023-05-26 | End: 2023-06-02

## 2023-05-26 RX ADMIN — CEFUROXIME AXETIL 500 MG: 250 TABLET, FILM COATED ORAL at 01:18

## 2023-05-26 RX ADMIN — CLOSTRIDIUM TETANI TOXOID ANTIGEN (FORMALDEHYDE INACTIVATED), CORYNEBACTERIUM DIPHTHERIAE TOXOID ANTIGEN (FORMALDEHYDE INACTIVATED), BORDETELLA PERTUSSIS TOXOID ANTIGEN (GLUTARALDEHYDE INACTIVATED), BORDETELLA PERTUSSIS FILAMENTOUS HEMAGGLUTININ ANTIGEN (FORMALDEHYDE INACTIVATED), BORDETELLA PERTUSSIS PERTACTIN ANTIGEN, AND BORDETELLA PERTUSSIS FIMBRIAE 2/3 ANTIGEN 0.5 ML: 5; 2; 2.5; 5; 3; 5 INJECTION, SUSPENSION INTRAMUSCULAR at 01:26

## 2023-05-26 RX ADMIN — METRONIDAZOLE 500 MG: 500 TABLET ORAL at 01:18

## 2023-05-26 ASSESSMENT — ACTIVITIES OF DAILY LIVING (ADL): ADLS_ACUITY_SCORE: 33

## 2023-05-26 NOTE — ED TRIAGE NOTES
Pt has a puncture wound to the 5th finger on his right hand from a cat bite. Pt has redness and swelling to lower part of 5th finger

## 2023-05-26 NOTE — ED PROVIDER NOTES
History     Chief Complaint:  Cat Bite       HPI   Tonny Giordano is a 43 year old male who presents to the ED for evaluation of redness, swelling, and pain of right 5th finger after a cat scratched his right 5th finger 2 days ago. Since then his pain and swelling seems to have spread down his finger. Here in the ED it is painful for him to bend his finger, but he is able to do this. Denies fever and drainage from the wound. Notes last tetanus was in 2013 and that he has been using an antibiotic cream. He denies any other symptoms.       Independent Historian:   None - Patient Only    Review of External Notes:  none    ROS:  See HPI    Allergies:  Penicillins   Codeine  Phenothiazines  Methadone    Physical Exam     Patient Vitals for the past 24 hrs:   BP Temp Temp src Pulse Resp SpO2   05/26/23 0041 (!) 139/100 98.9  F (37.2  C) Temporal 89 18 99 %        Physical Exam  General: Well appearing, nontoxic. Resting comfortably  Head:  Scalp, face, and head appear normal  Eyes:  Pupils equal, round    Conjunctivae noninjected and sclera white  ENT:    The nose is normal    Ears/pinnae are normal  Neck:  Normal range of motion  MSK:  No bony tenderness to palpation to the right 5th finger. Normal flexion and extension. Joints appear normal.  Skin:  1mm punctate puncture wound to the skin of the crease of the volar right 5th finger PIP joint. Joint itself is normal and non tender to palpation with full painless ROM. There is soft tissue swelling, erythema and induration proximal to this. CMS intact distally.   Neuro: Speech is normal and fluent    Moves all extremities spontaneously  Psych:  Awake, Alert. Normal affect      Appropriate interactions           Emergency Department Course   Emergency Department Course & Assessments:  Interventions:  Medications   cefuroxime (CEFTIN) tablet 500 mg (500 mg Oral $Given 5/26/23 0118)   metroNIDAZOLE (FLAGYL) tablet 500 mg (500 mg Oral $Given 5/26/23 0118)   Tdap  (tetanus-diphtheria-acell pertussis) (ADACEL) injection 0.5 mL (0.5 mLs Intramuscular $Given 5/26/23 8606)        Assessments, Independent Interpretation, Consult/Discussion of ManagementTests:  005 I obtained history and examined the patient as noted above.     Social Determinants of Health affecting care:  None    Disposition:  The patient was discharged to home.     Impression & Plan    Medical Decision Making:  Tonny Giordano is a 43 year old male who presents to the ED for evaluation of cat scratch with worsening redness and pain. Findings are consistent with mild cellulitis. No abscess. No evidence of septic flexor tenosynovitis or septic arthritis.  No proximal swelling or redness beyond this.  Patient be treated with antibiotics.  Given his prior penicillin allergy will treat with cefuroxime and metronidazole.  Tdap updated.  Close follow-up with PCP if not improving.  Return precautions are provided.  Patient was discharged in stable condition.  No evidence of other complication at this time.      Diagnosis:    ICD-10-CM    1. Cat scratch of left hand, initial encounter  S60.512A     W55.03XA            Discharge Medications:  Discharge Medication List as of 5/26/2023  1:27 AM      START taking these medications    Details   cefuroxime (CEFTIN) 500 MG tablet Take 1 tablet (500 mg) by mouth 2 times daily for 7 days, Disp-14 tablet, R-0, E-Prescribe      metroNIDAZOLE (FLAGYL) 500 MG tablet Take 1 tablet (500 mg) by mouth 3 times daily for 7 days, Disp-21 tablet, R-0, E-PrescribeEat yogurt or cottage cheese daily to prevent diarrhea that can be caused by taking this medication.                5/26/2023   Elton Aguilera MD       Historical Data:  ______________________________________________________________________  Medications:    Cardizem  Lopressor  Klonopin  Lamictal  lexapro  Doxepin  Citalopram    Past Medical History:   Past Surgical History:     Past Medical History:   Diagnosis Date     Anxiety       Depressive disorder     [place] tooth extraction    Atrial flutter  Thoracic aortic aneurysm   Atrial fibrillation   PTSD  Panic         Family History:    family history is not on file. Social History:        [facility]  05/26/23 5891

## 2023-05-26 NOTE — TELEPHONE ENCOUNTER
Tonny has a puncture wound from his cat to his Rt fifth finger, palmar aspect.    - This happened either last night or the night before.  - Pain is spreading down his finger.  - This is a small amt of swelling.    The animal is up to date on vaccinations.    Advised to see HCP within 4 hours or PCP triage  PCP is with [facility] Margarete Befroui, RN  [facility]      Reason for Disposition    [1] Puncture wound (hole through the skin) AND [2] from a cat bite (or deep claw puncture wound)    Additional Information    Negative: [1] Major bleeding (e.g., actively dripping or spurting) AND [2] can't be stopped    Negative: Sounds like a life-threatening emergency to the triager    Negative: [1] Any break in skin from BITE (e.g., cut, puncture or scratch) AND[2] WILD animal at risk for RABIES (e.g., bat, raccoon, rae, skunk, coyote, other carnivores)    Negative: [1] Any break in skin from BITE (e.g., cut, puncture or scratch) AND[2] PET animal (e.g., dog, cat, or ferret) at risk for RABIES (e.g., sick, stray, unprovoked bite, developing country)    Negative: [1] Any break in skin from BITE (e.g., cut, puncture or scratch) AND[2] monkey    Negative: [1] EXPOSURE of non-intact skin (e.g., exposed person has dermatitis, abrasion, wound) AND[2] with animal BODY FLUID (e.g., saliva such as licking, blood, brain) AND[3] animal at high-risk for RABIES (e.g., bat, raccoon, rae, skunk, coyote, other carnivores)    Negative: [1] Cut (length > 1/8 inch or 3 mm) or skin tear AND [2] any animal  (Exception: superficial scratch that doesn't go through dermis)    Negative: [1] Bleeding AND [2] won't stop after 10 minutes of direct pressure (using correct technique)    Negative: Description of bite sounds severe to the triager    Protocols used: ANIMAL BITE-A-

## 2024-02-17 ENCOUNTER — HOSPITAL ENCOUNTER (INPATIENT)
Facility: CLINIC | Age: 44
LOS: 2 days | Discharge: HOME | DRG: 513 | End: 2024-02-19
Attending: EMERGENCY MEDICINE | Admitting: HOSPITALIST
Payer: MEDICARE

## 2024-02-17 DIAGNOSIS — M65.949 FLEXOR TENOSYNOVITIS OF FINGER: Primary | ICD-10-CM

## 2024-02-17 LAB
ANION GAP SERPL CALCULATED.3IONS-SCNC: 10 MMOL/L (ref 7–15)
BASOPHILS # BLD AUTO: 0.1 10E3/UL (ref 0–0.2)
BASOPHILS NFR BLD AUTO: 1 %
BUN SERPL-MCNC: 12.6 MG/DL (ref 6–20)
CALCIUM SERPL-MCNC: 9.5 MG/DL (ref 8.6–10)
CHLORIDE SERPL-SCNC: 99 MMOL/L (ref 98–107)
CREAT SERPL-MCNC: 1.09 MG/DL (ref 0.67–1.17)
DEPRECATED HCO3 PLAS-SCNC: 27 MMOL/L (ref 22–29)
EGFRCR SERPLBLD CKD-EPI 2021: 86 ML/MIN/1.73M2
EOSINOPHIL # BLD AUTO: 0 10E3/UL (ref 0–0.7)
EOSINOPHIL NFR BLD AUTO: 1 %
ERYTHROCYTE [DISTWIDTH] IN BLOOD BY AUTOMATED COUNT: 11.8 % (ref 10–15)
GLUCOSE SERPL-MCNC: 92 MG/DL (ref 70–99)
HCT VFR BLD AUTO: 44.5 % (ref 40–53)
HGB BLD-MCNC: 15.2 G/DL (ref 13.3–17.7)
HOLD SPECIMEN: NORMAL
HOLD SPECIMEN: NORMAL
IMM GRANULOCYTES # BLD: 0 10E3/UL
IMM GRANULOCYTES NFR BLD: 0 %
LYMPHOCYTES # BLD AUTO: 2.3 10E3/UL (ref 0.8–5.3)
LYMPHOCYTES NFR BLD AUTO: 33 %
MCH RBC QN AUTO: 33.3 PG (ref 26.5–33)
MCHC RBC AUTO-ENTMCNC: 34.2 G/DL (ref 31.5–36.5)
MCV RBC AUTO: 98 FL (ref 78–100)
MONOCYTES # BLD AUTO: 0.6 10E3/UL (ref 0–1.3)
MONOCYTES NFR BLD AUTO: 9 %
NEUTROPHILS # BLD AUTO: 4 10E3/UL (ref 1.6–8.3)
NEUTROPHILS NFR BLD AUTO: 56 %
NRBC # BLD AUTO: 0 10E3/UL
NRBC BLD AUTO-RTO: 0 /100
PLATELET # BLD AUTO: 185 10E3/UL (ref 150–450)
POTASSIUM SERPL-SCNC: 3.9 MMOL/L (ref 3.4–5.3)
RBC # BLD AUTO: 4.56 10E6/UL (ref 4.4–5.9)
SODIUM SERPL-SCNC: 136 MMOL/L (ref 135–145)
WBC # BLD AUTO: 7 10E3/UL (ref 4–11)

## 2024-02-17 PROCEDURE — 258N000003 HC RX IP 258 OP 636: Performed by: EMERGENCY MEDICINE

## 2024-02-17 PROCEDURE — 120N000001 HC R&B MED SURG/OB

## 2024-02-17 PROCEDURE — 85025 COMPLETE CBC W/AUTO DIFF WBC: CPT | Performed by: EMERGENCY MEDICINE

## 2024-02-17 PROCEDURE — 99223 1ST HOSP IP/OBS HIGH 75: CPT | Mod: AI | Performed by: HOSPITALIST

## 2024-02-17 PROCEDURE — 80048 BASIC METABOLIC PNL TOTAL CA: CPT | Performed by: EMERGENCY MEDICINE

## 2024-02-17 PROCEDURE — 99285 EMERGENCY DEPT VISIT HI MDM: CPT | Mod: 25

## 2024-02-17 PROCEDURE — 36415 COLL VENOUS BLD VENIPUNCTURE: CPT | Performed by: EMERGENCY MEDICINE

## 2024-02-17 PROCEDURE — 250N000011 HC RX IP 250 OP 636: Performed by: EMERGENCY MEDICINE

## 2024-02-17 PROCEDURE — 250N000013 HC RX MED GY IP 250 OP 250 PS 637: Performed by: HOSPITALIST

## 2024-02-17 RX ORDER — ACETAMINOPHEN 325 MG/1
650 TABLET ORAL EVERY 4 HOURS PRN
Status: DISCONTINUED | OUTPATIENT
Start: 2024-02-17 | End: 2024-02-18

## 2024-02-17 RX ORDER — METRONIDAZOLE 500 MG/100ML
500 INJECTION, SOLUTION INTRAVENOUS EVERY 12 HOURS
Status: DISCONTINUED | OUTPATIENT
Start: 2024-02-18 | End: 2024-02-18

## 2024-02-17 RX ORDER — CEFTRIAXONE 2 G/1
2 INJECTION, POWDER, FOR SOLUTION INTRAMUSCULAR; INTRAVENOUS ONCE
Status: COMPLETED | OUTPATIENT
Start: 2024-02-17 | End: 2024-02-17

## 2024-02-17 RX ORDER — ACETAMINOPHEN 650 MG/1
650 SUPPOSITORY RECTAL EVERY 4 HOURS PRN
Status: DISCONTINUED | OUTPATIENT
Start: 2024-02-17 | End: 2024-02-19 | Stop reason: HOSPADM

## 2024-02-17 RX ORDER — AMOXICILLIN 250 MG
1 CAPSULE ORAL 2 TIMES DAILY PRN
Status: DISCONTINUED | OUTPATIENT
Start: 2024-02-17 | End: 2024-02-19 | Stop reason: HOSPADM

## 2024-02-17 RX ORDER — CALCIUM CARBONATE 500 MG/1
1000 TABLET, CHEWABLE ORAL 4 TIMES DAILY PRN
Status: DISCONTINUED | OUTPATIENT
Start: 2024-02-17 | End: 2024-02-19 | Stop reason: HOSPADM

## 2024-02-17 RX ORDER — NALOXONE HYDROCHLORIDE 0.4 MG/ML
0.2 INJECTION, SOLUTION INTRAMUSCULAR; INTRAVENOUS; SUBCUTANEOUS
Status: DISCONTINUED | OUTPATIENT
Start: 2024-02-17 | End: 2024-02-19 | Stop reason: HOSPADM

## 2024-02-17 RX ORDER — HYDROMORPHONE HYDROCHLORIDE 2 MG/1
2 TABLET ORAL EVERY 4 HOURS PRN
Status: DISCONTINUED | OUTPATIENT
Start: 2024-02-17 | End: 2024-02-19 | Stop reason: HOSPADM

## 2024-02-17 RX ORDER — CEFTRIAXONE 1 G/1
1 INJECTION, POWDER, FOR SOLUTION INTRAMUSCULAR; INTRAVENOUS EVERY 24 HOURS
Status: DISCONTINUED | OUTPATIENT
Start: 2024-02-18 | End: 2024-02-18

## 2024-02-17 RX ORDER — LIDOCAINE 40 MG/G
CREAM TOPICAL
Status: DISCONTINUED | OUTPATIENT
Start: 2024-02-17 | End: 2024-02-19 | Stop reason: HOSPADM

## 2024-02-17 RX ORDER — NALOXONE HYDROCHLORIDE 0.4 MG/ML
0.4 INJECTION, SOLUTION INTRAMUSCULAR; INTRAVENOUS; SUBCUTANEOUS
Status: DISCONTINUED | OUTPATIENT
Start: 2024-02-17 | End: 2024-02-19 | Stop reason: HOSPADM

## 2024-02-17 RX ORDER — AMOXICILLIN 250 MG
2 CAPSULE ORAL 2 TIMES DAILY PRN
Status: DISCONTINUED | OUTPATIENT
Start: 2024-02-17 | End: 2024-02-19 | Stop reason: HOSPADM

## 2024-02-17 RX ADMIN — HYDROMORPHONE HYDROCHLORIDE 1 MG: 2 TABLET ORAL at 23:44

## 2024-02-17 RX ADMIN — CEFTRIAXONE 2 G: 2 INJECTION, POWDER, FOR SOLUTION INTRAMUSCULAR; INTRAVENOUS at 22:05

## 2024-02-17 RX ADMIN — VANCOMYCIN HYDROCHLORIDE 2000 MG: 5 INJECTION, POWDER, LYOPHILIZED, FOR SOLUTION INTRAVENOUS at 22:42

## 2024-02-17 ASSESSMENT — ACTIVITIES OF DAILY LIVING (ADL)
ADLS_ACUITY_SCORE: 35
ADLS_ACUITY_SCORE: 33

## 2024-02-18 ENCOUNTER — ANESTHESIA (OUTPATIENT)
Dept: SURGERY | Facility: CLINIC | Age: 44
DRG: 513 | End: 2024-02-18
Payer: MEDICARE

## 2024-02-18 ENCOUNTER — ANESTHESIA EVENT (OUTPATIENT)
Dept: SURGERY | Facility: CLINIC | Age: 44
DRG: 513 | End: 2024-02-18
Payer: MEDICARE

## 2024-02-18 LAB
ANION GAP SERPL CALCULATED.3IONS-SCNC: 8 MMOL/L (ref 7–15)
BUN SERPL-MCNC: 15.3 MG/DL (ref 6–20)
CALCIUM SERPL-MCNC: 9.1 MG/DL (ref 8.6–10)
CHLORIDE SERPL-SCNC: 103 MMOL/L (ref 98–107)
CREAT SERPL-MCNC: 1.12 MG/DL (ref 0.67–1.17)
DEPRECATED HCO3 PLAS-SCNC: 27 MMOL/L (ref 22–29)
EGFRCR SERPLBLD CKD-EPI 2021: 83 ML/MIN/1.73M2
ERYTHROCYTE [DISTWIDTH] IN BLOOD BY AUTOMATED COUNT: 11.9 % (ref 10–15)
GLUCOSE SERPL-MCNC: 93 MG/DL (ref 70–99)
HCT VFR BLD AUTO: 45.3 % (ref 40–53)
HGB BLD-MCNC: 15.3 G/DL (ref 13.3–17.7)
MCH RBC QN AUTO: 33.5 PG (ref 26.5–33)
MCHC RBC AUTO-ENTMCNC: 33.8 G/DL (ref 31.5–36.5)
MCV RBC AUTO: 99 FL (ref 78–100)
PLATELET # BLD AUTO: 174 10E3/UL (ref 150–450)
POTASSIUM SERPL-SCNC: 4.2 MMOL/L (ref 3.4–5.3)
RBC # BLD AUTO: 4.57 10E6/UL (ref 4.4–5.9)
SODIUM SERPL-SCNC: 138 MMOL/L (ref 135–145)
WBC # BLD AUTO: 6.3 10E3/UL (ref 4–11)

## 2024-02-18 PROCEDURE — 0LB70ZZ EXCISION OF RIGHT HAND TENDON, OPEN APPROACH: ICD-10-PCS | Performed by: STUDENT IN AN ORGANIZED HEALTH CARE EDUCATION/TRAINING PROGRAM

## 2024-02-18 PROCEDURE — 250N000013 HC RX MED GY IP 250 OP 250 PS 637: Performed by: HOSPITALIST

## 2024-02-18 PROCEDURE — 258N000003 HC RX IP 258 OP 636

## 2024-02-18 PROCEDURE — 36415 COLL VENOUS BLD VENIPUNCTURE: CPT | Performed by: HOSPITALIST

## 2024-02-18 PROCEDURE — 710N000009 HC RECOVERY PHASE 1, LEVEL 1, PER MIN: Performed by: STUDENT IN AN ORGANIZED HEALTH CARE EDUCATION/TRAINING PROGRAM

## 2024-02-18 PROCEDURE — 250N000013 HC RX MED GY IP 250 OP 250 PS 637

## 2024-02-18 PROCEDURE — 250N000011 HC RX IP 250 OP 636: Performed by: ANESTHESIOLOGY

## 2024-02-18 PROCEDURE — 360N000076 HC SURGERY LEVEL 3, PER MIN: Performed by: STUDENT IN AN ORGANIZED HEALTH CARE EDUCATION/TRAINING PROGRAM

## 2024-02-18 PROCEDURE — 80048 BASIC METABOLIC PNL TOTAL CA: CPT | Performed by: HOSPITALIST

## 2024-02-18 PROCEDURE — 250N000009 HC RX 250: Performed by: ANESTHESIOLOGY

## 2024-02-18 PROCEDURE — 250N000011 HC RX IP 250 OP 636: Performed by: STUDENT IN AN ORGANIZED HEALTH CARE EDUCATION/TRAINING PROGRAM

## 2024-02-18 PROCEDURE — 250N000011 HC RX IP 250 OP 636: Performed by: NURSE ANESTHETIST, CERTIFIED REGISTERED

## 2024-02-18 PROCEDURE — 258N000003 HC RX IP 258 OP 636: Performed by: HOSPITALIST

## 2024-02-18 PROCEDURE — 999N000141 HC STATISTIC PRE-PROCEDURE NURSING ASSESSMENT: Performed by: STUDENT IN AN ORGANIZED HEALTH CARE EDUCATION/TRAINING PROGRAM

## 2024-02-18 PROCEDURE — 250N000011 HC RX IP 250 OP 636: Performed by: HOSPITALIST

## 2024-02-18 PROCEDURE — 250N000013 HC RX MED GY IP 250 OP 250 PS 637: Performed by: STUDENT IN AN ORGANIZED HEALTH CARE EDUCATION/TRAINING PROGRAM

## 2024-02-18 PROCEDURE — 370N000017 HC ANESTHESIA TECHNICAL FEE, PER MIN: Performed by: STUDENT IN AN ORGANIZED HEALTH CARE EDUCATION/TRAINING PROGRAM

## 2024-02-18 PROCEDURE — 99233 SBSQ HOSP IP/OBS HIGH 50: CPT | Performed by: STUDENT IN AN ORGANIZED HEALTH CARE EDUCATION/TRAINING PROGRAM

## 2024-02-18 PROCEDURE — 258N000003 HC RX IP 258 OP 636: Performed by: ANESTHESIOLOGY

## 2024-02-18 PROCEDURE — 250N000009 HC RX 250: Performed by: STUDENT IN AN ORGANIZED HEALTH CARE EDUCATION/TRAINING PROGRAM

## 2024-02-18 PROCEDURE — 120N000001 HC R&B MED SURG/OB

## 2024-02-18 PROCEDURE — 250N000025 HC SEVOFLURANE, PER MIN: Performed by: STUDENT IN AN ORGANIZED HEALTH CARE EDUCATION/TRAINING PROGRAM

## 2024-02-18 PROCEDURE — 258N000003 HC RX IP 258 OP 636: Performed by: NURSE ANESTHETIST, CERTIFIED REGISTERED

## 2024-02-18 PROCEDURE — 85014 HEMATOCRIT: CPT | Performed by: HOSPITALIST

## 2024-02-18 PROCEDURE — 272N000001 HC OR GENERAL SUPPLY STERILE: Performed by: STUDENT IN AN ORGANIZED HEALTH CARE EDUCATION/TRAINING PROGRAM

## 2024-02-18 RX ORDER — BISACODYL 10 MG
10 SUPPOSITORY, RECTAL RECTAL DAILY PRN
Status: DISCONTINUED | OUTPATIENT
Start: 2024-02-18 | End: 2024-02-19 | Stop reason: HOSPADM

## 2024-02-18 RX ORDER — ONDANSETRON 2 MG/ML
INJECTION INTRAMUSCULAR; INTRAVENOUS PRN
Status: DISCONTINUED | OUTPATIENT
Start: 2024-02-18 | End: 2024-02-18

## 2024-02-18 RX ORDER — ACETAMINOPHEN 325 MG/1
650 TABLET ORAL EVERY 4 HOURS PRN
Status: DISCONTINUED | OUTPATIENT
Start: 2024-02-21 | End: 2024-02-19 | Stop reason: HOSPADM

## 2024-02-18 RX ORDER — DILTIAZEM HYDROCHLORIDE 240 MG/1
240 CAPSULE, COATED, EXTENDED RELEASE ORAL DAILY
COMMUNITY

## 2024-02-18 RX ORDER — LAMOTRIGINE 100 MG/1
100 TABLET ORAL AT BEDTIME
Status: DISCONTINUED | OUTPATIENT
Start: 2024-02-18 | End: 2024-02-19 | Stop reason: HOSPADM

## 2024-02-18 RX ORDER — CEFTRIAXONE 2 G/1
2 INJECTION, POWDER, FOR SOLUTION INTRAMUSCULAR; INTRAVENOUS EVERY 24 HOURS
Status: DISCONTINUED | OUTPATIENT
Start: 2024-02-18 | End: 2024-02-19 | Stop reason: HOSPADM

## 2024-02-18 RX ORDER — FENTANYL CITRATE 50 UG/ML
50 INJECTION, SOLUTION INTRAMUSCULAR; INTRAVENOUS EVERY 5 MIN PRN
Status: DISCONTINUED | OUTPATIENT
Start: 2024-02-18 | End: 2024-02-18 | Stop reason: HOSPADM

## 2024-02-18 RX ORDER — LIDOCAINE HYDROCHLORIDE 20 MG/ML
INJECTION, SOLUTION INFILTRATION; PERINEURAL PRN
Status: DISCONTINUED | OUTPATIENT
Start: 2024-02-18 | End: 2024-02-18

## 2024-02-18 RX ORDER — CLONAZEPAM 2 MG/1
2 TABLET ORAL 2 TIMES DAILY
COMMUNITY

## 2024-02-18 RX ORDER — CLONAZEPAM 0.5 MG/1
2 TABLET ORAL 2 TIMES DAILY
Status: DISCONTINUED | OUTPATIENT
Start: 2024-02-18 | End: 2024-02-19 | Stop reason: HOSPADM

## 2024-02-18 RX ORDER — SODIUM CHLORIDE, SODIUM LACTATE, POTASSIUM CHLORIDE, CALCIUM CHLORIDE 600; 310; 30; 20 MG/100ML; MG/100ML; MG/100ML; MG/100ML
INJECTION, SOLUTION INTRAVENOUS CONTINUOUS PRN
Status: DISCONTINUED | OUTPATIENT
Start: 2024-02-18 | End: 2024-02-18

## 2024-02-18 RX ORDER — SODIUM CHLORIDE, SODIUM LACTATE, POTASSIUM CHLORIDE, CALCIUM CHLORIDE 600; 310; 30; 20 MG/100ML; MG/100ML; MG/100ML; MG/100ML
INJECTION, SOLUTION INTRAVENOUS CONTINUOUS
Status: DISCONTINUED | OUTPATIENT
Start: 2024-02-18 | End: 2024-02-19 | Stop reason: HOSPADM

## 2024-02-18 RX ORDER — ONDANSETRON 2 MG/ML
4 INJECTION INTRAMUSCULAR; INTRAVENOUS EVERY 30 MIN PRN
Status: DISCONTINUED | OUTPATIENT
Start: 2024-02-18 | End: 2024-02-18 | Stop reason: HOSPADM

## 2024-02-18 RX ORDER — LAMOTRIGINE 100 MG/1
100 TABLET ORAL EVERY MORNING
Status: DISCONTINUED | OUTPATIENT
Start: 2024-02-18 | End: 2024-02-19 | Stop reason: HOSPADM

## 2024-02-18 RX ORDER — ESCITALOPRAM OXALATE 20 MG/1
10 TABLET ORAL EVERY MORNING
COMMUNITY

## 2024-02-18 RX ORDER — LAMOTRIGINE 100 MG/1
100 TABLET ORAL EVERY MORNING
COMMUNITY

## 2024-02-18 RX ORDER — PROPOFOL 10 MG/ML
INJECTION, EMULSION INTRAVENOUS PRN
Status: DISCONTINUED | OUTPATIENT
Start: 2024-02-18 | End: 2024-02-18

## 2024-02-18 RX ORDER — ESCITALOPRAM OXALATE 10 MG/1
10 TABLET ORAL EVERY MORNING
Status: DISCONTINUED | OUTPATIENT
Start: 2024-02-18 | End: 2024-02-19 | Stop reason: HOSPADM

## 2024-02-18 RX ORDER — FENTANYL CITRATE 50 UG/ML
25 INJECTION, SOLUTION INTRAMUSCULAR; INTRAVENOUS EVERY 5 MIN PRN
Status: DISCONTINUED | OUTPATIENT
Start: 2024-02-18 | End: 2024-02-18 | Stop reason: HOSPADM

## 2024-02-18 RX ORDER — CYCLOBENZAPRINE HCL 10 MG
10 TABLET ORAL 2 TIMES DAILY PRN
Status: DISCONTINUED | OUTPATIENT
Start: 2024-02-18 | End: 2024-02-19 | Stop reason: HOSPADM

## 2024-02-18 RX ORDER — HYDROMORPHONE HCL IN WATER/PF 6 MG/30 ML
0.2 PATIENT CONTROLLED ANALGESIA SYRINGE INTRAVENOUS EVERY 5 MIN PRN
Status: DISCONTINUED | OUTPATIENT
Start: 2024-02-18 | End: 2024-02-18 | Stop reason: HOSPADM

## 2024-02-18 RX ORDER — ACETAMINOPHEN 325 MG/1
975 TABLET ORAL EVERY 8 HOURS
Status: DISCONTINUED | OUTPATIENT
Start: 2024-02-18 | End: 2024-02-19 | Stop reason: HOSPADM

## 2024-02-18 RX ORDER — HYDROXYZINE HYDROCHLORIDE 25 MG/1
25 TABLET, FILM COATED ORAL EVERY 6 HOURS PRN
Status: DISCONTINUED | OUTPATIENT
Start: 2024-02-18 | End: 2024-02-19 | Stop reason: HOSPADM

## 2024-02-18 RX ORDER — CEFAZOLIN SODIUM/WATER 2 G/20 ML
2 SYRINGE (ML) INTRAVENOUS SEE ADMIN INSTRUCTIONS
Status: DISCONTINUED | OUTPATIENT
Start: 2024-02-18 | End: 2024-02-18 | Stop reason: HOSPADM

## 2024-02-18 RX ORDER — LIDOCAINE 40 MG/G
CREAM TOPICAL
Status: DISCONTINUED | OUTPATIENT
Start: 2024-02-18 | End: 2024-02-19 | Stop reason: HOSPADM

## 2024-02-18 RX ORDER — HYDROMORPHONE HCL IN WATER/PF 6 MG/30 ML
0.2 PATIENT CONTROLLED ANALGESIA SYRINGE INTRAVENOUS
Status: DISCONTINUED | OUTPATIENT
Start: 2024-02-18 | End: 2024-02-19 | Stop reason: HOSPADM

## 2024-02-18 RX ORDER — ESCITALOPRAM OXALATE 20 MG/1
20 TABLET ORAL AT BEDTIME
Status: DISCONTINUED | OUTPATIENT
Start: 2024-02-18 | End: 2024-02-19 | Stop reason: HOSPADM

## 2024-02-18 RX ORDER — OXYCODONE HYDROCHLORIDE 10 MG/1
10 TABLET ORAL EVERY 4 HOURS PRN
Status: DISCONTINUED | OUTPATIENT
Start: 2024-02-18 | End: 2024-02-19 | Stop reason: HOSPADM

## 2024-02-18 RX ORDER — ONDANSETRON 4 MG/1
4 TABLET, ORALLY DISINTEGRATING ORAL EVERY 30 MIN PRN
Status: DISCONTINUED | OUTPATIENT
Start: 2024-02-18 | End: 2024-02-18 | Stop reason: HOSPADM

## 2024-02-18 RX ORDER — ESCITALOPRAM OXALATE 20 MG/1
20 TABLET ORAL AT BEDTIME
COMMUNITY

## 2024-02-18 RX ORDER — LIDOCAINE 40 MG/G
CREAM TOPICAL
Status: DISCONTINUED | OUTPATIENT
Start: 2024-02-18 | End: 2024-02-18 | Stop reason: HOSPADM

## 2024-02-18 RX ORDER — HYDROMORPHONE HCL IN WATER/PF 6 MG/30 ML
0.4 PATIENT CONTROLLED ANALGESIA SYRINGE INTRAVENOUS
Status: DISCONTINUED | OUTPATIENT
Start: 2024-02-18 | End: 2024-02-19 | Stop reason: HOSPADM

## 2024-02-18 RX ORDER — DILTIAZEM HYDROCHLORIDE 120 MG/1
240 CAPSULE, COATED, EXTENDED RELEASE ORAL DAILY
Status: DISCONTINUED | OUTPATIENT
Start: 2024-02-18 | End: 2024-02-19 | Stop reason: HOSPADM

## 2024-02-18 RX ORDER — OXYCODONE HYDROCHLORIDE 5 MG/1
5 TABLET ORAL EVERY 4 HOURS PRN
Status: DISCONTINUED | OUTPATIENT
Start: 2024-02-18 | End: 2024-02-19 | Stop reason: HOSPADM

## 2024-02-18 RX ORDER — BUPIVACAINE HYDROCHLORIDE 5 MG/ML
INJECTION, SOLUTION EPIDURAL; INTRACAUDAL
Status: COMPLETED | OUTPATIENT
Start: 2024-02-18 | End: 2024-02-18

## 2024-02-18 RX ORDER — DEXAMETHASONE SODIUM PHOSPHATE 4 MG/ML
INJECTION, SOLUTION INTRA-ARTICULAR; INTRALESIONAL; INTRAMUSCULAR; INTRAVENOUS; SOFT TISSUE PRN
Status: DISCONTINUED | OUTPATIENT
Start: 2024-02-18 | End: 2024-02-18

## 2024-02-18 RX ORDER — HYDROMORPHONE HCL IN WATER/PF 6 MG/30 ML
0.4 PATIENT CONTROLLED ANALGESIA SYRINGE INTRAVENOUS EVERY 5 MIN PRN
Status: DISCONTINUED | OUTPATIENT
Start: 2024-02-18 | End: 2024-02-18 | Stop reason: HOSPADM

## 2024-02-18 RX ORDER — ONDANSETRON 4 MG/1
4 TABLET, ORALLY DISINTEGRATING ORAL EVERY 6 HOURS PRN
Status: DISCONTINUED | OUTPATIENT
Start: 2024-02-18 | End: 2024-02-19 | Stop reason: HOSPADM

## 2024-02-18 RX ORDER — POLYETHYLENE GLYCOL 3350 17 G/17G
17 POWDER, FOR SOLUTION ORAL DAILY
Status: DISCONTINUED | OUTPATIENT
Start: 2024-02-19 | End: 2024-02-19 | Stop reason: HOSPADM

## 2024-02-18 RX ORDER — SODIUM CHLORIDE, SODIUM LACTATE, POTASSIUM CHLORIDE, CALCIUM CHLORIDE 600; 310; 30; 20 MG/100ML; MG/100ML; MG/100ML; MG/100ML
INJECTION, SOLUTION INTRAVENOUS CONTINUOUS
Status: DISCONTINUED | OUTPATIENT
Start: 2024-02-18 | End: 2024-02-18 | Stop reason: HOSPADM

## 2024-02-18 RX ORDER — ONDANSETRON 2 MG/ML
4 INJECTION INTRAMUSCULAR; INTRAVENOUS EVERY 6 HOURS PRN
Status: DISCONTINUED | OUTPATIENT
Start: 2024-02-18 | End: 2024-02-19 | Stop reason: HOSPADM

## 2024-02-18 RX ORDER — LIDOCAINE HYDROCHLORIDE 20 MG/ML
INJECTION, SOLUTION INFILTRATION; PERINEURAL
Status: COMPLETED | OUTPATIENT
Start: 2024-02-18 | End: 2024-02-18

## 2024-02-18 RX ORDER — LABETALOL HYDROCHLORIDE 5 MG/ML
10 INJECTION, SOLUTION INTRAVENOUS
Status: DISCONTINUED | OUTPATIENT
Start: 2024-02-18 | End: 2024-02-18 | Stop reason: HOSPADM

## 2024-02-18 RX ORDER — METRONIDAZOLE 500 MG/100ML
500 INJECTION, SOLUTION INTRAVENOUS EVERY 8 HOURS
Status: DISCONTINUED | OUTPATIENT
Start: 2024-02-18 | End: 2024-02-19 | Stop reason: HOSPADM

## 2024-02-18 RX ORDER — SILDENAFIL 25 MG/1
25-100 TABLET, FILM COATED ORAL DAILY PRN
COMMUNITY

## 2024-02-18 RX ORDER — IBUPROFEN 600 MG/1
600 TABLET, FILM COATED ORAL EVERY 6 HOURS PRN
Status: DISCONTINUED | OUTPATIENT
Start: 2024-02-18 | End: 2024-02-19 | Stop reason: HOSPADM

## 2024-02-18 RX ORDER — CYCLOBENZAPRINE HCL 10 MG
10 TABLET ORAL 2 TIMES DAILY PRN
COMMUNITY

## 2024-02-18 RX ORDER — FENTANYL CITRATE 50 UG/ML
INJECTION, SOLUTION INTRAMUSCULAR; INTRAVENOUS
Status: COMPLETED | OUTPATIENT
Start: 2024-02-18 | End: 2024-02-18

## 2024-02-18 RX ORDER — CEFAZOLIN SODIUM/WATER 2 G/20 ML
2 SYRINGE (ML) INTRAVENOUS
Status: COMPLETED | OUTPATIENT
Start: 2024-02-18 | End: 2024-02-18

## 2024-02-18 RX ORDER — FENTANYL CITRATE 50 UG/ML
INJECTION, SOLUTION INTRAMUSCULAR; INTRAVENOUS PRN
Status: DISCONTINUED | OUTPATIENT
Start: 2024-02-18 | End: 2024-02-18

## 2024-02-18 RX ORDER — METOPROLOL TARTRATE 25 MG/1
25 TABLET, FILM COATED ORAL PRN
COMMUNITY

## 2024-02-18 RX ORDER — AMOXICILLIN 250 MG
1 CAPSULE ORAL 2 TIMES DAILY
Status: DISCONTINUED | OUTPATIENT
Start: 2024-02-18 | End: 2024-02-19 | Stop reason: HOSPADM

## 2024-02-18 RX ORDER — LAMOTRIGINE 100 MG/1
100 TABLET ORAL AT BEDTIME
COMMUNITY

## 2024-02-18 RX ORDER — MAGNESIUM HYDROXIDE 1200 MG/15ML
LIQUID ORAL PRN
Status: DISCONTINUED | OUTPATIENT
Start: 2024-02-18 | End: 2024-02-18 | Stop reason: HOSPADM

## 2024-02-18 RX ADMIN — BUPIVACAINE HYDROCHLORIDE 20 ML: 5 INJECTION, SOLUTION EPIDURAL; INTRACAUDAL at 13:15

## 2024-02-18 RX ADMIN — SODIUM CHLORIDE, POTASSIUM CHLORIDE, SODIUM LACTATE AND CALCIUM CHLORIDE: 600; 310; 30; 20 INJECTION, SOLUTION INTRAVENOUS at 13:26

## 2024-02-18 RX ADMIN — CEFTRIAXONE 2 G: 2 INJECTION, POWDER, FOR SOLUTION INTRAMUSCULAR; INTRAVENOUS at 20:12

## 2024-02-18 RX ADMIN — DEXAMETHASONE SODIUM PHOSPHATE 8 MG: 4 INJECTION, SOLUTION INTRA-ARTICULAR; INTRALESIONAL; INTRAMUSCULAR; INTRAVENOUS; SOFT TISSUE at 13:38

## 2024-02-18 RX ADMIN — DILTIAZEM HYDROCHLORIDE 240 MG: 120 CAPSULE, COATED, EXTENDED RELEASE ORAL at 12:01

## 2024-02-18 RX ADMIN — ACETAMINOPHEN 975 MG: 325 TABLET, FILM COATED ORAL at 20:19

## 2024-02-18 RX ADMIN — SODIUM CHLORIDE, POTASSIUM CHLORIDE, SODIUM LACTATE AND CALCIUM CHLORIDE: 600; 310; 30; 20 INJECTION, SOLUTION INTRAVENOUS at 14:16

## 2024-02-18 RX ADMIN — METRONIDAZOLE 500 MG: 500 INJECTION, SOLUTION INTRAVENOUS at 18:33

## 2024-02-18 RX ADMIN — SODIUM CHLORIDE 1000 ML: 9 INJECTION, SOLUTION INTRAVENOUS at 01:42

## 2024-02-18 RX ADMIN — LIDOCAINE HYDROCHLORIDE 30 MG: 20 INJECTION, SOLUTION INFILTRATION; PERINEURAL at 13:35

## 2024-02-18 RX ADMIN — SODIUM CHLORIDE, POTASSIUM CHLORIDE, SODIUM LACTATE AND CALCIUM CHLORIDE: 600; 310; 30; 20 INJECTION, SOLUTION INTRAVENOUS at 20:58

## 2024-02-18 RX ADMIN — MIDAZOLAM 2 MG: 1 INJECTION INTRAMUSCULAR; INTRAVENOUS at 13:15

## 2024-02-18 RX ADMIN — FENTANYL CITRATE 100 MCG: 50 INJECTION INTRAMUSCULAR; INTRAVENOUS at 13:15

## 2024-02-18 RX ADMIN — CLONAZEPAM 2 MG: 0.5 TABLET ORAL at 20:12

## 2024-02-18 RX ADMIN — HYDROMORPHONE HYDROCHLORIDE 1 MG: 2 TABLET ORAL at 10:24

## 2024-02-18 RX ADMIN — FENTANYL CITRATE 100 MCG: 50 INJECTION, SOLUTION INTRAMUSCULAR; INTRAVENOUS at 13:33

## 2024-02-18 RX ADMIN — ESCITALOPRAM OXALATE 10 MG: 10 TABLET ORAL at 12:08

## 2024-02-18 RX ADMIN — PROPOFOL 200 MG: 10 INJECTION, EMULSION INTRAVENOUS at 13:37

## 2024-02-18 RX ADMIN — SODIUM CHLORIDE, POTASSIUM CHLORIDE, SODIUM LACTATE AND CALCIUM CHLORIDE: 600; 310; 30; 20 INJECTION, SOLUTION INTRAVENOUS at 13:02

## 2024-02-18 RX ADMIN — METRONIDAZOLE 500 MG: 500 INJECTION, SOLUTION INTRAVENOUS at 01:42

## 2024-02-18 RX ADMIN — METRONIDAZOLE 500 MG: 500 INJECTION, SOLUTION INTRAVENOUS at 10:33

## 2024-02-18 RX ADMIN — OXYCODONE HYDROCHLORIDE 5 MG: 5 TABLET ORAL at 22:37

## 2024-02-18 RX ADMIN — ONDANSETRON 4 MG: 2 INJECTION INTRAMUSCULAR; INTRAVENOUS at 13:50

## 2024-02-18 RX ADMIN — LAMOTRIGINE 100 MG: 100 TABLET ORAL at 22:37

## 2024-02-18 RX ADMIN — LAMOTRIGINE 100 MG: 100 TABLET ORAL at 12:01

## 2024-02-18 RX ADMIN — FENTANYL CITRATE 25 MCG: 0.05 INJECTION, SOLUTION INTRAMUSCULAR; INTRAVENOUS at 14:30

## 2024-02-18 RX ADMIN — LIDOCAINE HYDROCHLORIDE 10 ML: 20 INJECTION, SOLUTION INFILTRATION; PERINEURAL at 13:15

## 2024-02-18 RX ADMIN — Medication 2 G: at 13:33

## 2024-02-18 RX ADMIN — ESCITALOPRAM OXALATE 20 MG: 20 TABLET ORAL at 22:36

## 2024-02-18 RX ADMIN — CLONAZEPAM 2 MG: 0.5 TABLET ORAL at 12:01

## 2024-02-18 ASSESSMENT — ACTIVITIES OF DAILY LIVING (ADL)
ADLS_ACUITY_SCORE: 22
ADLS_ACUITY_SCORE: 20
ADLS_ACUITY_SCORE: 22
ADLS_ACUITY_SCORE: 23
ADLS_ACUITY_SCORE: 22

## 2024-02-18 NOTE — ANESTHESIA POSTPROCEDURE EVALUATION
Patient: Tonny Giordano    Procedure: Procedure(s):  Irrigation and debridement right hand including index finger flexor tendon sheath       Anesthesia Type:  General    Note:  Disposition: Inpatient   Postop Pain Control:    PONV: No   Neuro/Psych: Uneventful            Sign Out: Acceptable/Baseline neuro status   Airway/Respiratory: Uneventful            Sign Out: Acceptable/Baseline resp. status   CV/Hemodynamics: Uneventful            Sign Out: Acceptable CV status; No obvious hypovolemia; No obvious fluid overload   Other NRE:    DID A NON-ROUTINE EVENT OCCUR? No           Last vitals:  Vitals Value Taken Time   /84 02/18/24 1450   Temp 97.7  F (36.5  C) 02/18/24 1450   Pulse 65 02/18/24 1456   Resp 12 02/18/24 1450   SpO2 92 % 02/18/24 1456   Vitals shown include unfiled device data.    Electronically Signed By: Zuleyma Jimenez MD  February 18, 2024  2:56 PM

## 2024-02-18 NOTE — ED PROVIDER NOTES
"  History     Chief Complaint:  Cat Bite       The history is provided by the patient.      Tonny Giordano is a 44 year old male who presents with cat bite. About 3 days ago, his cat accidentally got spooked and but him on the right pointer finger. He was seen at urgent care the next day, and given Doxycycline and Metronidazole. However, there is red rings around the bite marks and the swelling has worsened which makes it difficulty to bend the finger due to the pain and swelling. Additionally, he has had some chills today.  He denies fever.  He is up-to-date on his tetanus booster, but the cat was not vaccinated for rabies.  He plans on bringing the cat to the vet.    Independent Historian:   None - Patient Only    Review of External Notes:   Outpatient clinic notes reviewed.  No recent clinic notes available.    Medications:    Citalopram hydrobromide    Doxepin   Finasteride   Lamotrigine   Phenergan   Doxycycline   Metronidazole      Past Medical History:    Anxiety   Depressive disorder   Hiatal hernia   Somatization disorder   Panic disorder   Metabolic acidosis    Aflutter   Bicuspid aortic valve   Thoracic aortic aneurysm   Afib   PTSD  Alopecia   Gynecomastia      Past Surgical History:    [place] teeth extraction     Physical Exam   Patient Vitals for the past 24 hrs:   BP Temp Temp src Pulse Resp SpO2 Height Weight   02/17/24 2001 (!) 131/95 99  F (37.2  C) Oral 86 22 98 % 1.88 m (6' 2\") 86.6 kg (191 lb)        Physical Exam  General: Adult male, sitting upright  Eyes: PERRL, Conjunctive within normal limits  ENT: Moist mucous membranes, oropharynx clear.   CV: Normal S1S2, no murmur, rub or gallop. Regular rate and rhythm  Resp: Normal respiratory effort.  MSK: There is swelling of the right index finger predominantly over the palmar aspect with pustular heads noted in the mid and distal phalanx where there are puncture wounds.  There are also 2 puncture wounds on the opposite side of the finger that do " not have pustular heads or significant surrounding erythema.  The patient holds the finger in mild flexion.  He is able to fully extend but with significant pain.  There is tenderness over the flexor surface of the right index finger.    Skin: Warm and dry.  Erythema of the palmar surface of the right index finger most significant surrounding pustular lesions noted over the distal and mid phalanx..    Neuro: Alert and oriented. Responds appropriately to all questions and commands. No focal findings appreciated.   Psych: Normal mood and affect. Pleasant.         Emergency Department Course     Laboratory:  Labs Ordered and Resulted from Time of ED Arrival to Time of ED Departure   CBC WITH PLATELETS AND DIFFERENTIAL - Abnormal       Result Value    WBC Count 7.0      RBC Count 4.56      Hemoglobin 15.2      Hematocrit 44.5      MCV 98      MCH 33.3 (*)     MCHC 34.2      RDW 11.8      Platelet Count 185      % Neutrophils 56      % Lymphocytes 33      % Monocytes 9      % Eosinophils 1      % Basophils 1      % Immature Granulocytes 0      NRBCs per 100 WBC 0      Absolute Neutrophils 4.0      Absolute Lymphocytes 2.3      Absolute Monocytes 0.6      Absolute Eosinophils 0.0      Absolute Basophils 0.1      Absolute Immature Granulocytes 0.0      Absolute NRBCs 0.0     BASIC METABOLIC PANEL - Normal    Sodium 136      Potassium 3.9      Chloride 99      Carbon Dioxide (CO2) 27      Anion Gap 10      Urea Nitrogen 12.6      Creatinine 1.09      GFR Estimate 86      Calcium 9.5      Glucose 92            Emergency Department Course & Assessments:    Interventions:  Medications   vancomycin (VANCOCIN) 2,000 mg in 0.9% NaCl 500 mL intermittent infusion (2,000 mg Intravenous $New Bag 2/17/24 2242)   cefTRIAXone (ROCEPHIN) 2 g vial to attach to  ml bag for ADULTS or NS 50 ml bag for PEDS (0 g Intravenous Stopped 2/17/24 2242)        Assessments:  2136 I obtained history and examined the patient as noted above.   I  rechecked the patient and explained findings.  I discussed admission which she is agreeable to.  All questions were answered.    Independent Interpretation (X-rays, CTs, rhythm strip):  None    Consultations/Discussion of Management or Tests:  2148 I spoke with Dr. Posadas from Orthopedic Hand Surgery.    I spoke with Dr. Isaacs of the hospitalist service regarding admission.  He accepts the patient.    Social Determinants of Health affecting care:   None    Disposition:  The patient was admitted to the hospital under the care of Dr. Isaacs.     Impression & Plan    Medical Decision Making:  Tonny Giordano is a 44-year-old male right-hand-dominant presents emergency department concerns for increasing pain and swelling to his right finger where he sustained a cat bite 4 days prior to assessment.  Has been on 3 days of antibiotics with worsening.  He has clinical evidence today of probable flexor tenosynovitis.  He is afebrile and does not have systemic symptoms aside from chills.  He is no leukocytosis.  He is not toxic appearing.  He would benefit from admission with IV antibiotic therapy and likely orthopedic involvement for possible washout of the affected finger.  Consulted orthopedics from the ED and they are aware of the plan for admission and asked that the patient be n.p.o. after midnight.  I discussed this with the patient and he was in agreement with plan for admission.  All questions were answered.      Diagnosis:    ICD-10-CM    1. Flexor tenosynovitis of finger  M65.9 Case Request: IRRIGATION AND DEBRIDEMENT RIGHT HAND INCLUDING INDEX FINGER FLEXOR TENDON SHEATH     Case Request: IRRIGATION AND DEBRIDEMENT RIGHT HAND INCLUDING INDEX FINGER FLEXOR TENDON SHEATH                Scribe Disclosure:  I, Leon Doshi, am serving as a scribe at 9:44 PM on 2/17/2024 to document services personally performed by Monica Allen MD based on my observations and the provider's statements to me.   2/17/2024    Monica Allen MD Jonkman, Tracy Dianne, MD  02/18/24 0037

## 2024-02-18 NOTE — H&P
[facility]    History and Physical - Hospitalist Service       Date of Admission:  2/17/2024    Assessment & Plan   Tonny Giordano is a 44 year old male admitted on 2/17/2024. He presents with worsening cat bite in the right index finger.  Accident happened on February 14, he has been taking antibiotic at home but pain and swelling are progressing.  He has been seen by orthopedic surgeon tonight who suspect early tenosynovitis and is requesting admission for I&D tomorrow.  In the emergency department patient has received Rocephin and vancomycin.    Right index finger cat bit infection with suspected underlying tenosynovitis.  Admit to inpatient.  N.p.o. after midnight.  Vitals per unit routine.  Pain control with Tylenol.  Dilaudid for breakthrough pain.  Orthopedic surgeon formal consult requested.  Antibiotic therapy at the discretion of rounder and orthopedic surgeon after I&D in the morning. Rocephin/metronidazole active orders.     Other active chronic comorbidities.  Anxiety/depression.    Hiatal hernia.  PTSD.  Bicuspid aortic valve.  Thoracic aortic aneurysm.  Medication should be resumed after reconciliation likely when he recovers from the anesthesia.     Diet:  NPO  DVT Prophylaxis: Pneumatic Compression Devices  Zhu Catheter: Not present  Lines: None     Cardiac Monitoring: None  Code Status:  Full     Clinically Significant Risk Factors Present on Admission          Disposition Plan     2/20/2024       Pako Isaacs MD  Hospitalist Service  [facility]  Securely message with [employer] (more info)  Text page via [facility] Paging/Directory     ______________________________________________________________________    Chief Complaint   Right index finger cat bite    History is obtained from the patient, electronic health record, and emergency department physician    History of Present Illness   Tonny Giordano is a 44 year old male who had a cat bite from his cat on  February 14 in his right in the finger.  He has been treated at home with antibiotic but inflammation and pain are worsening.  He presented to the emergency department for an assessment, orthopedic surgeon is suspect an early tenosynovitis and is requesting admission for I&D in the morning as well as antibiotic treatment.      Past Medical History    Past Medical History:   Diagnosis Date    Anxiety     Depressive disorder        Past Surgical History   No past surgical history on file.    Prior to Admission Medications   Prior to Admission Medications   Prescriptions Last Dose Informant Patient Reported? Taking?   CITALOPRAM HYDROBROMIDE PO   Yes No   DOXEPIN HCL PO   Yes No   FINASTERIDE PO   Yes No   LAMOTRIGINE PO   Yes No   promethazine (PHENERGAN) 25 MG suppository   No No   Sig: Place 1 suppository (25 mg) rectally every 6 hours as needed for nausea      Facility-Administered Medications: None        Review of Systems    The 10 point Review of Systems is negative other than noted in the HPI or here.       Physical Exam   Vital Signs: Temp: 99  F (37.2  C) Temp src: Oral BP: (!) 131/95 Pulse: 86   Resp: 22 SpO2: 98 % O2 Device: None (Room air)    Weight: 191 lbs 0 oz    General Appearance: Atrophic, normal appearance.  Respiratory: Not laboring, normal effort.  Normal breath sounds.  Cardiovascular: Regular rate and rhythm.  No murmur heard.  GI: Abdomen soft.  No organomegaly.  Skin: Right finger swelling and redness.  Marks of animal fan.  See picture below         Medical Decision Making        75 MINUTES SPENT BY ME on the date of service doing chart review, history, exam, documentation & further activities per the note.      Data     I have personally reviewed the following data over the past 24 hrs:    7.0  \   15.2   / 185     136 99 12.6 /  92   3.9 27 1.09 \       Imaging results reviewed over the past 24 hrs:   No results found for this or any previous visit (from the past 24 hour(s)).

## 2024-02-18 NOTE — ED TRIAGE NOTES
Pt arrives due to a cat bite that happened on 2/14 and pt went into UC and they prescribed doxycyline along with another antibiotic. Pt report symptoms got worse, including extreme redness, swelling, pain, throbbing without touch, pustules, difficulty bending finger. Pt reports he may have taken a double dose of the antibiotic due to taking depression medications at the same time.

## 2024-02-18 NOTE — ANESTHESIA PROCEDURE NOTES
Brachial plexus Procedure Note    Pre-Procedure   Staff -        Anesthesiologist:  Zuleyma Jimenez MD       Performed By: anesthesiologist       Referred By: BATSHEVA Posadas       Location: pre-op       Procedure Start/Stop Times: 2/18/2024 1:15 PM and 2/18/2024 1:21 PM       Pre-Anesthestic Checklist: patient identified, IV checked, site marked, risks and benefits discussed, informed consent, monitors and equipment checked, pre-op evaluation, at physician/surgeon's request and post-op pain management  Timeout:       Correct Patient: Yes        Correct Procedure: Yes        Correct Site: Yes        Correct Position: Yes        Correct Laterality: Yes        Site Marked: Yes  Procedure Documentation  Procedure: Brachial plexus       Diagnosis: CAT BITE/FINGER INFECTION       Laterality: right       Patient Position: sitting       Patient Prep/Sterile Barriers: sterile gloves, mask       Skin prep: Chloraprep       Local skin infiltrated with 3 mL of 1% lidocaine.  (supraclavicular approach).       Needle Gauge: 20.        Needle Length (Inches): 4        Ultrasound guided       1. Ultrasound was used to identify targeted nerve, plexus, vascular marker, or fascial plane and place a needle adjacent to it in real-time.       2. Ultrasound was used to visualize the spread of anesthetic in close proximity to the above referenced structure.       4. The visualized anatomic structures appeared normal.       5. There were no apparent abnormal pathologic findings.    Assessment/Narrative         The placement was negative for: blood aspirated, painful injection and site bleeding       Paresthesias: No.       Bolus given via needle. no blood aspirated via catheter.        Secured via.        Insertion/Infusion Method: Single Shot       Complications: none    Medication(s) Administered   Bupivacaine 0.5% PF (Infiltration) - Infiltration   20 mL - 2/18/2024 1:15:00 PM  fentaNYL (PF) (SUBLIMAZE) injection - Intravenous   100  "mcg - 2/18/2024 1:15:00 PM  midazolam (VERSED) injection - Intravenous   2 mg - 2/18/2024 1:15:00 PM  Lidocaine 2% (Infiltration) - Infiltration   10 mL - 2/18/2024 1:15:00 PM  Medication Administration Time: 2/18/2024 1:15 PM      FOR [facility] ONLY:   Pain Team Contact information: please page the Pain Team Via [employer] / [facility]. Search \"Pain\". During daytime hours, please page the attending first. At night please page the resident first.      "

## 2024-02-18 NOTE — CONSULTS
[facility]    Orthopedics Consultation    Date of Admission:  2/17/2024    Assessment & Plan     Tonny Giordano  is a 44 year old male who presents with a right index finger cat bite which was sustained on February 14, 2024.  He swelling and erythema.  He was initially started on oral and a biotics as an outpatient since yesterday.  No significant improvement in his symptoms.  Index finger middle and distal phalanx.  No tenderness to palp patient over the flexor tendon near the proximal phalanx or palmar aspect of the hand.  He is able to achieve full extension and has approximately 90 degrees of flexion at the PIP joint.  He does not appear to have flexor tenosynovitis at this time bite but does appear to be developing small abscesses along the middle distal phalanx.  He has been on ceftriaxone and Flagyl for IV antibiotics due to a penicillin allergy    I discussed this finding with the patient along with potential treatment including continued IV antibiotics and nonoperative treatment versus surgical debridement.  Considering he has not made any significant improvement despite being on antibiotics I recommended proceeding with an operative irrigation and debridement.  I discussed we will evaluate his flexor tendon sheath which may require debridement as well.  We discussed risks related to surgery including stiffness, ongoing neurovascular injury.     Plan for right hand I&D including right index finger, possible flexor tendon sheath    Dx: Right hand cat bite, Right index finger abscess    PRASHANT BURTON MD      Code Status    Full Code    Reason for Consult   Reason for consult: flexor tenosynovitis 2/2 cat bite    Primary Care Physician   [facility]    Chief Complaint   Cat bite to right index finger     History of Present Illness   History is obtained from the patient    Tonny Giordano is a 44 year old male who sustained a cat bit on 2/14. His cat got spooked and bit  him in his right index finger. He went to an urgent care the following day and was given doxycycline and metronidazole. Unfortunately, he continued to develop redness and increased swelling over the coming days. He also noted chills yesterday but denies fever. His cat is not vaccinated. Tetanus is up to date.         Past Medical History   I have reviewed this patient's medical history and updated it with pertinent information if needed.   Past Medical History:   Diagnosis Date    Anxiety     Depressive disorder          Past Surgical History   I have reviewed this patient's surgical history and updated it with pertinent information if needed.  No past surgical history on file.      Prior to Admission Medications   Prior to Admission Medications   Prescriptions Last Dose Informant Patient Reported? Taking?   CITALOPRAM HYDROBROMIDE PO   Yes No   DOXEPIN HCL PO   Yes No   FINASTERIDE PO   Yes No   LAMOTRIGINE PO   Yes No   promethazine (PHENERGAN) 25 MG suppository   No No   Sig: Place 1 suppository (25 mg) rectally every 6 hours as needed for nausea      Facility-Administered Medications: None         Allergies   Allergies   Allergen Reactions    Compazine [Prochlorperazine] Other (See Comments)     Muscle spasm    Penicillins          Social History   I have reviewed this patient's social history and updated it with pertinent information if needed.   Tonny Giordano        Family History   I have reviewed this patient's family history and updated it with pertinent information if needed.   No family history on file.      Review of Systems   The 10 point Review of Systems is negative other than noted in the HPI or here.       Physical Exam   Temp: 97.4  F (36.3  C) Temp src: Temporal BP: 135/84 Pulse: 79   Resp: 18 SpO2: 95 % O2 Device: None (Room air)    Vital Signs with Ranges  Temp:  [97.4  F (36.3  C)-99  F (37.2  C)] 97.4  F (36.3  C)  Pulse:  [72-86] 79  Resp:  [18-22] 18  BP: (123-135)/(83-95) 135/84  SpO2:  [94  %-98 %] 95 %  194 lbs 0 oz    Constitutional: awake, alert, cooperative, no apparent distress, and appears stated age  Eyes: extra-ocular muscles intact, no scleral icterus  ENT: normocepalic, atraumatic without obvious abnormality  Respiratory: no increased work of breathing, symmetric chest wall expansion    MSK:  Right Upper Extremity  2 punctate wounds are noted over the radial aspect of the right index finger and 2 punctate wounds are noted over the ulnar aspect of the index finger.  The 2 radial punctate wounds have notable erythema and swelling around them and involve the middle and distal phalanx  The patient is able to achieve full extension but is limited in flexion to 90 degrees of flexion at the PIP joint.  No tenderness to palpation over the flexor tendon at the proximal phalanx or hand  The other digits appear intact. No obvious wounds  Sensations intact to light touch in the median, ulnar, radial nerve distributions  FPL, EPL, Intrinsic hand muscles are intact  2+ radial pulse    Left Upper Extremity  Skin is intact. No swelling or ecchymosis  No tenderness to palpation over the shoulder, arm, elbow, forearm, wrist or hand.  No pain with ROM of the shoulder, elbow, wrist, hand  Sensations intact to light touch in the median, ulnar, radial nerve distributions  FPL, EPL, Intrinsic hand muscles are intact  2+ radial pulse      Data   Results for orders placed or performed during the hospital encounter of 02/17/24 (from the past 24 hour(s))   CBC with platelets differential    Narrative    The following orders were created for panel order CBC with platelets differential.  Procedure                               Abnormality         Status                     ---------                               -----------         ------                     CBC with platelets and d...[[id number]]  Abnormal            Final result                 Please view results for these tests on the individual orders.   Basic  metabolic panel   Result Value Ref Range    Sodium 136 135 - 145 mmol/L    Potassium 3.9 3.4 - 5.3 mmol/L    Chloride 99 98 - 107 mmol/L    Carbon Dioxide (CO2) 27 22 - 29 mmol/L    Anion Gap 10 7 - 15 mmol/L    Urea Nitrogen 12.6 6.0 - 20.0 mg/dL    Creatinine 1.09 0.67 - 1.17 mg/dL    GFR Estimate 86 >60 mL/min/1.73m2    Calcium 9.5 8.6 - 10.0 mg/dL    Glucose 92 70 - 99 mg/dL   Extra Tube ([place] Draw)    Narrative    The following orders were created for panel order Extra Tube ([place] Draw).  Procedure                               Abnormality         Status                     ---------                               -----------         ------                     Extra Blue Top Tube[[id number]]                              Final result               Extra Red Top Tube[[id number]]                               Final result                 Please view results for these tests on the individual orders.   CBC with platelets and differential   Result Value Ref Range    WBC Count 7.0 4.0 - 11.0 10e3/uL    RBC Count 4.56 4.40 - 5.90 10e6/uL    Hemoglobin 15.2 13.3 - 17.7 g/dL    Hematocrit 44.5 40.0 - 53.0 %    MCV 98 78 - 100 fL    MCH 33.3 (H) 26.5 - 33.0 pg    MCHC 34.2 31.5 - 36.5 g/dL    RDW 11.8 10.0 - 15.0 %    Platelet Count 185 150 - 450 10e3/uL    % Neutrophils 56 %    % Lymphocytes 33 %    % Monocytes 9 %    % Eosinophils 1 %    % Basophils 1 %    % Immature Granulocytes 0 %    NRBCs per 100 WBC 0 <1 /100    Absolute Neutrophils 4.0 1.6 - 8.3 10e3/uL    Absolute Lymphocytes 2.3 0.8 - 5.3 10e3/uL    Absolute Monocytes 0.6 0.0 - 1.3 10e3/uL    Absolute Eosinophils 0.0 0.0 - 0.7 10e3/uL    Absolute Basophils 0.1 0.0 - 0.2 10e3/uL    Absolute Immature Granulocytes 0.0 <=0.4 10e3/uL    Absolute NRBCs 0.0 10e3/uL   Extra Blue Top Tube   Result Value Ref Range    Hold Specimen JIC    Extra Red Top Tube   Result Value Ref Range    Hold Specimen JI    Basic metabolic panel   Result Value Ref Range    Sodium 138 135 -  145 mmol/L    Potassium 4.2 3.4 - 5.3 mmol/L    Chloride 103 98 - 107 mmol/L    Carbon Dioxide (CO2) 27 22 - 29 mmol/L    Anion Gap 8 7 - 15 mmol/L    Urea Nitrogen 15.3 6.0 - 20.0 mg/dL    Creatinine 1.12 0.67 - 1.17 mg/dL    GFR Estimate 83 >60 mL/min/1.73m2    Calcium 9.1 8.6 - 10.0 mg/dL    Glucose 93 70 - 99 mg/dL   CBC with platelets   Result Value Ref Range    WBC Count 6.3 4.0 - 11.0 10e3/uL    RBC Count 4.57 4.40 - 5.90 10e6/uL    Hemoglobin 15.3 13.3 - 17.7 g/dL    Hematocrit 45.3 40.0 - 53.0 %    MCV 99 78 - 100 fL    MCH 33.5 (H) 26.5 - 33.0 pg    MCHC 33.8 31.5 - 36.5 g/dL    RDW 11.9 10.0 - 15.0 %    Platelet Count 174 150 - 450 10e3/uL

## 2024-02-18 NOTE — ED NOTES
"[facility]  ED Nurse Handoff Report    ED Chief complaint: Cat Bite  . ED Diagnosis:   Final diagnoses:   Flexor tenosynovitis of finger       Allergies:   Allergies   Allergen Reactions    Compazine [Prochlorperazine] Other (See Comments)     Muscle spasm    Penicillins        Code Status: Full Code    Activity level - Baseline/Home:  independent.  Activity Level - Current:   standby.   Lift room needed: No.   Bariatric: No   Needed: No   Isolation: No.   Infection: Not Applicable.     Respiratory status: Room air    Vital Signs (within 30 minutes):   Vitals:    02/17/24 2001   BP: (!) 131/95   Pulse: 86   Resp: 22   Temp: 99  F (37.2  C)   TempSrc: Oral   SpO2: 98%   Weight: 86.6 kg (191 lb)   Height: 1.88 m (6' 2\")       Cardiac Rhythm:  ,      Pain level:    Patient confused: No.   Patient Falls Risk: patient and family education.   Elimination Status: Has voided     Patient Report - Initial Complaint: Cat bite.   Focused Assessment:    Chief Complaint:  Cat Bite        The history is provided by the patient.      Tonny Giordano is a 44 year old male who presents with cat bite. About 3 days ago, his cat accidentally got spooked and but him on the right pointer finger. He was seen at urgent care the next day, and given Doxycycline and Metronidazole. However, there is red rings around the bite marks and the swelling has worsened which makes it difficulty to bend the finger. Additionally, he has had some chills today. He is up-to-date on his tetanus booster, but the cat was not vaccinated for rabies.      Abnormal Results:   Labs Ordered and Resulted from Time of ED Arrival to Time of ED Departure   CBC WITH PLATELETS AND DIFFERENTIAL - Abnormal       Result Value    WBC Count 7.0      RBC Count 4.56      Hemoglobin 15.2      Hematocrit 44.5      MCV 98      MCH 33.3 (*)     MCHC 34.2      RDW 11.8      Platelet Count 185      % Neutrophils 56      % Lymphocytes 33      % Monocytes " 9      % Eosinophils 1      % Basophils 1      % Immature Granulocytes 0      NRBCs per 100 WBC 0      Absolute Neutrophils 4.0      Absolute Lymphocytes 2.3      Absolute Monocytes 0.6      Absolute Eosinophils 0.0      Absolute Basophils 0.1      Absolute Immature Granulocytes 0.0      Absolute NRBCs 0.0     BASIC METABOLIC PANEL - Normal    Sodium 136      Potassium 3.9      Chloride 99      Carbon Dioxide (CO2) 27      Anion Gap 10      Urea Nitrogen 12.6      Creatinine 1.09      GFR Estimate 86      Calcium 9.5      Glucose 92          No orders to display       Treatments provided: See MAR. Abx. Frequent monitoring of pt.  Family Comments: N/a  OBS brochure/video discussed/provided to patient:  No  ED Medications:   Medications   cefTRIAXone (ROCEPHIN) 2 g vial to attach to  ml bag for ADULTS or NS 50 ml bag for PEDS (2 g Intravenous $New Bag 2/17/24 8241)   vancomycin (VANCOCIN) 2,000 mg in 0.9% NaCl 500 mL intermittent infusion (has no administration in time range)       Drips infusing:  No  For the majority of the shift this patient was Green.   Interventions performed were N/a.    Sepsis treatment initiated: No    Cares/treatment/interventions/medications to be completed following ED care: N/a    ED Nurse Name: Amanda Fam RN  10:12 PM  RECEIVING UNIT ED HANDOFF REVIEW    Above ED Nurse Handoff Report was reviewed: Yes  Reviewed by: Kristen Carlton RN on February 17, 2024 at 10:38 PM

## 2024-02-18 NOTE — ANESTHESIA PROCEDURE NOTES
Airway       Patient location during procedure: OR       Procedure Start/Stop Times: 2/18/2024 1:39 PM  Staff -        CRNA: Tad Alonso APRN CRNA       Performed By: CRNA  Consent for Airway        Urgency: elective  Indications and Patient Condition       Indications for airway management: ingrid-procedural, airway protection and altered level of consciousness       Induction type:intravenous       Mask difficulty assessment: 1 - vent by mask    Final Airway Details       Final airway type: supraglottic airway    Supraglottic Airway Details        Type: LMA       Brand: I-Gel       LMA size: 4    Post intubation assessment        Placement verified by: capnometry, equal breath sounds and chest rise        Number of attempts at approach: 1       Secured with: tape       Ease of procedure: easy       Dentition: Intact    Medication(s) Administered   Medication Administration Time: 2/18/2024 1:39 PM

## 2024-02-18 NOTE — PLAN OF CARE
Plan of care    Tonny Giordano is a 44-year-old male who presents with a right index finger cat bite.  His bite occurred on February 14.  He has been on oral antibiotics but has had ongoing pain and swelling.  He presented to [facility] emergency department.  I spoke with Dr. Allen.  There are signs of an early flexor tenosynovitis.    Recommend medicine admission for IV antibiotics  Orthopedic surgery will follow  N.p.o. at midnight for potential I&D pending morning evaluation    PRASHANT BURTON MD

## 2024-02-18 NOTE — PHARMACY-ADMISSION MEDICATION HISTORY
Pharmacist Admission Medication History    Admission medication history is complete. The information provided in this note is only as accurate as the sources available at the time of the update.    Information Source(s): Patient via in-person    Pertinent Information:      Changes made to PTA medication list:  Added: all meds  Deleted: celexa, doxepin, finasteride  Changed: None    Allergies reviewed with patient and updates made in EHR: yes    Medication History Completed By: Prabha Antoine [facility] 2/18/2024 9:48 AM    Prior to Admission medications    Medication Sig Last Dose Taking? Auth Provider Long Term End Date   clonazePAM (KLONOPIN) 2 MG tablet Take 2 mg by mouth 2 times daily 2/17/2024 at pm Yes Unknown, Entered By History     cyclobenzaprine (FLEXERIL) 10 MG tablet Take 10 mg by mouth 2 times daily as needed for muscle spasms  Yes Unknown, Entered By History     diltiazem ER COATED BEADS (CARDIZEM CD/CARTIA XT) 240 MG 24 hr capsule Take 240 mg by mouth daily 2/17/2024 at am Yes Unknown, Entered By History Yes    escitalopram (LEXAPRO) 20 MG tablet Take 20 mg by mouth at bedtime 2/17/2024 at hs Yes Unknown, Entered By History Yes    escitalopram (LEXAPRO) 20 MG tablet Take 10 mg by mouth every morning 2/17/2024 at am Yes Unknown, Entered By History Yes    lamoTRIgine (LAMICTAL) 100 MG tablet Take 100 mg by mouth every morning 2/17/2024 at am Yes Unknown, Entered By History No    lamoTRIgine (LAMICTAL) 100 MG tablet Take 100 mg by mouth at bedtime 2/17/2024 at hs Yes Unknown, Entered By History No    metoprolol tartrate (LOPRESSOR) 25 MG tablet Take 25 mg by mouth as needed (per PCP directions)  Yes Unknown, Entered By History Yes    sildenafil (VIAGRA) 25 MG tablet Take  mg by mouth daily as needed TAKE 2 TO 4 TABLETS BY MOUTH 1 HOUR PRIOR TO INTERCOURS  Yes Unknown, Entered By History Yes    FINASTERIDE PO    Reported, Patient

## 2024-02-18 NOTE — OP NOTE
ORTHOPEDIC SURGERY  OPERATIVE NOTE    Tonny Giordano  [id number]  1980 February 18, 2024      PREOPERATIVE DIAGNOSIS:    Right index finger cat bite  Right index finger abscess    POSTOPERATIVE DIAGNOSIS:   Same    PROCEDURE:    Right index finger irrigation and excisional debridement of skin, subcutaneous tissue, fascia, flexor tendon.    SURGEON:  Roger Posadas MD    ASSISTANT: Nikky Henderson PA-C, A skilled first assistant was necessary for this procedure for assistance with patient positioning, prepping, draping, surgical visualization, wound closure, and application of the dressing.     SPECIMENS:  None     COMPLICATIONS:  None    IMPLANTS: * No implants in log *    INDICATIONS:    Tonny Giordano is a 44 year old male who presents with a right index finger cat bite which was sustained on February 14, 2024.  He swelling and erythema.  He was initially started on oral and a biotics as an outpatient since yesterday.  No significant improvement in his symptoms.  Index finger middle and distal phalanx.  No tenderness to palp patient over the flexor tendon near the proximal phalanx or palmar aspect of the hand.  He is able to achieve full extension and has approximately 90 degrees of flexion at the PIP joint.  He does not appear to have flexor tenosynovitis at this time bite but does appear to be developing small abscesses along the middle distal phalanx.  He has been on ceftriaxone and Flagyl for IV antibiotics due to a penicillin allergy     I discussed this finding with the patient along with potential treatment including continued IV antibiotics and nonoperative treatment versus surgical debridement.  Considering he has not made any significant improvement despite being on antibiotics I recommended proceeding with an operative irrigation and debridement.  I discussed we will evaluate his flexor tendon sheath which may require debridement as well.  We discussed risks related to surgery including  stiffness, ongoing neurovascular injury.     The risks of bleeding, infection, nerve damage, loss of motion, pain, further surgery, ongoing infection, loss of function. stiffness, and the medical risks of anesthesia were discussed. Benefits including treating his infection.     Alternatives including nonoperative management were discussed, but not recommended.  The patient was in agreement with the plan to proceed.  The informed consent was signed and documented.  Met with the patient preoperatively to davion the operative extremity.    OPERATIVE COURSE:    The patient was brought into the operating room and placed on operating table.  The patient underwent general anesthesia.  The patient was positioned in a supine position on hand table.  All bony prominences were well-padded.  The operative extremity was cleansed with Hibiclens. The operative extremity was then prepped with ChloraPrep.  The surgical team scrubbed in.  A WHO timeout was conducted to verify correct patient, correct extremity, presence of the surgeon's initials on the operative extremity, and administration of IV antibiotics, in this case Ancef. Pt is on Rocephin and Flagyl ATC     The patient was noted to have 2 large punctate wounds along the radial aspect of the index finger involving the middle and distal phalanx.  There was a surrounding pustule and draining pus.  A Camron type incision was utilized to connect these 2 wounds and avoid a longitudinal incision over the flexor crease.  Careful dissection was utilized to soft tissues to avoid neurovascular structures.  The flexor tendon was identified.  There was no obvious pus or infection around the flexor tendon.  The infection seems to be contained to 2 focal areas of abscesses along the radial aspect of the index finger.  The wound was then copiously irrigated.  Sharp dissection was utilized along the skin, soft tissue, flexor tendon and tendon sheath utilizing rongeurs and curettes to remove  infectious and devitalized appearing tissue.  Altogether the wound measured approximately 2 x 2 cm and was 0.5 cm deep.  After thorough irrigation.  No obvious infectious tissue remained.  The wound was then closed with 4-0 nylon.  The wound was dressed with Xeroform, gauze, and Coban.    All instruments were accounted for at the end of the case. The patient awoke from anesthesia and was transferred to the PACU in stable condition.      POST OP PLAN:    1.  Continue Abx (Flag/Tye) x 24 hours.   2.  Ambulatory for DVT prophylaxis.   3.  No PACU x-rays.   4.  Weightbearing and finger motion as tolerated  5.  May remove bandage after 3 days and redress with bandaids. No soaking wound. May get wet and let water run off of it  6.  Discharge: likely tomorrow after abx      FOLLOWUP:     Please call as soon as possible to make an appointment to be seen in Dr. Roger Posadas's clinic in 2 weeks.     Dr. Posadas's care coordinator is Yaneth Noel. Please contact her at [phone] to schedule an appointment.      Dr. Posadas sees patient's at 2 clinic locations:  [facility] On license of [facility]  [address] [place] [address]  [facility] [place]   [address] , [address], [place] [address]      Please call the on-call phone number [phone] during evenings, nights and weekends for any urgent needs. Prescription refills must be done during business hours by calling [phone]      Roger Posadas MD  [facility]

## 2024-02-18 NOTE — PLAN OF CARE
Pt is alert and oriented x4, independent with transfers, pt reported pain to R index, prn Oxycodone 2.5 mg given with effect. Per pt he is voiding without difficulties, last bowel movement in ed. NS 1000 ml bolus infusing at 100 ml/hr. Flagyl given. R index swollen with 4 cat bite sites filled with pus. Redness around. Pt is NPO, plan surgery tomorrow afternoon.

## 2024-02-18 NOTE — ANESTHESIA CARE TRANSFER NOTE
Patient: Tonny Giordano    Procedure: Procedure(s):  Irrigation and debridement right hand including index finger flexor tendon sheath       Diagnosis: Flexor tenosynovitis of finger [M65.9]  Diagnosis Additional Information: No value filed.    Anesthesia Type:   Peripheral Nerve Block     Note:    Oropharynx: spontaneously breathing  Level of Consciousness: awake  Oxygen Supplementation: face mask    Independent Airway: airway patency satisfactory and stable  Dentition: dentition unchanged  Vital Signs Stable: post-procedure vital signs reviewed and stable  Report to RN Given: handoff report given  Patient transferred to: PACU  Comments: Awake, alert, oxygen per face mask.        Vitals:  Vitals Value Taken Time   BP     Temp     Pulse 71 02/18/24 1422   Resp 8 02/18/24 1422   SpO2 99 % 02/18/24 1422   Vitals shown include unfiled device data.    Electronically Signed By: MARIANA Sanchez CRNA  February 18, 2024  2:23 PM

## 2024-02-18 NOTE — PROGRESS NOTES
[facility]    Medicine Progress Note - Hospitalist Service    Date of Admission:  2/17/2024    Assessment & Plan   Tonny Giordano is a 44 year old male admitted with PMH significant for SYED/MDD, PTSD, bicuspic aortic valve, thoracic aortic aneurysm who was admitted on 2/17/2024 with worsening cat bite in the right index finger.  Accident happened on February 14, he has been taking antibiotic at home but pain and swelling are progressing.  He has been seen by orthopedic surgeon tonight who suspect early tenosynovitis and is requesting admission for I&D tomorrow.  In the emergency department patient has received Rocephin and vancomycin.     Right index finger cat bit infection with suspected underlying tenosynovitis.  Vitally stable.  Reportedly taking doxycycline and metronidazole as outpatient and failed to improve.  Did have some discharge from the would a couple days ago but none recently.  7/10 pain.  No pain, swelling, or erythema extending past the PIP joint.   -Ortho consulted, appreciate recs   -Plan for surgical I&D this afternoon  -Continue ceftriaxone & flagyl given penicillin allergy  -Dilaudid for breakthrough pain  -NPO until post-operative         Other active chronic comorbidities.  PTSD/Anxiety/depression: I have resumed PTA lexapro, lamictal, clonazepam    Hiatal hernia.  Bicuspid aortic valve.  Thoracic aortic aneurysm.          Diet: NPO per Anesthesia Guidelines for Procedure/Surgery Except for: Meds    DVT Prophylaxis: Pneumatic Compression Devices  Zhu Catheter: Not present  Lines: None     Cardiac Monitoring: None  Code Status: Full Code      Clinically Significant Risk Factors Present on Admission                                  Disposition Plan      Expected Discharge Date: 02/20/2024                    Alejandro Caldwell DO  Hospitalist Service  [facility]  Securely message with [employer] (more info)  Text page via [facility] Paging/Directory    ______________________________________________________________________    Interval History   NAEO. Patient with 7/10 pain.  No fevers.  No other changes.  No drainage to wound.     Physical Exam   Vital Signs: Temp: 97.6  F (36.4  C) Temp src: Temporal BP: 117/82 Pulse: 61   Resp: 16 SpO2: 96 % O2 Device: None (Room air)    Weight: 194 lbs 0 oz    General Appearance: Awake. Alert.  NAD.  Well appearing.  Respiratory: CTAB. Normal WOB on RA.  Cardiovascular: RRR no mrg.  No peripheral edema.  GI: Benign, soft.   Skin: Right index finger with 2 obvious puncture marks that are swollen.  Skin overlying is taut and red.  No obvious drainage or discharge.  Wounds are distal to the PIP joint.   Other: Appropriate.  Pleasant.      Medical Decision Making       50 MINUTES SPENT BY ME on the date of service doing chart review, history, exam, documentation & further activities per the note.      Data   ------------------------- PAST 24 HR DATA REVIEWED -----------------------------------------------    I have personally reviewed the following data over the past 24 hrs:    6.3  \   15.3   / 174     138 103 15.3 /  93   4.2 27 1.12 \       Imaging results reviewed over the past 24 hrs:   No results found for this or any previous visit (from the past 24 hour(s)).

## 2024-02-19 VITALS
HEIGHT: 74 IN | TEMPERATURE: 97 F | HEART RATE: 61 BPM | BODY MASS INDEX: 24.9 KG/M2 | WEIGHT: 194 LBS | OXYGEN SATURATION: 96 % | RESPIRATION RATE: 16 BRPM | DIASTOLIC BLOOD PRESSURE: 78 MMHG | SYSTOLIC BLOOD PRESSURE: 114 MMHG

## 2024-02-19 LAB
ANION GAP SERPL CALCULATED.3IONS-SCNC: 9 MMOL/L (ref 7–15)
BUN SERPL-MCNC: 9.7 MG/DL (ref 6–20)
CALCIUM SERPL-MCNC: 8.9 MG/DL (ref 8.6–10)
CHLORIDE SERPL-SCNC: 101 MMOL/L (ref 98–107)
CREAT SERPL-MCNC: 0.92 MG/DL (ref 0.67–1.17)
DEPRECATED HCO3 PLAS-SCNC: 24 MMOL/L (ref 22–29)
EGFRCR SERPLBLD CKD-EPI 2021: >90 ML/MIN/1.73M2
ERYTHROCYTE [DISTWIDTH] IN BLOOD BY AUTOMATED COUNT: 11.4 % (ref 10–15)
GLUCOSE SERPL-MCNC: 124 MG/DL (ref 70–99)
HCT VFR BLD AUTO: 46.4 % (ref 40–53)
HGB BLD-MCNC: 15.8 G/DL (ref 13.3–17.7)
MCH RBC QN AUTO: 33.6 PG (ref 26.5–33)
MCHC RBC AUTO-ENTMCNC: 34.1 G/DL (ref 31.5–36.5)
MCV RBC AUTO: 99 FL (ref 78–100)
PLATELET # BLD AUTO: 202 10E3/UL (ref 150–450)
POTASSIUM SERPL-SCNC: 4.5 MMOL/L (ref 3.4–5.3)
RBC # BLD AUTO: 4.7 10E6/UL (ref 4.4–5.9)
SODIUM SERPL-SCNC: 134 MMOL/L (ref 135–145)
WBC # BLD AUTO: 9.2 10E3/UL (ref 4–11)

## 2024-02-19 PROCEDURE — 99239 HOSP IP/OBS DSCHRG MGMT >30: CPT | Performed by: STUDENT IN AN ORGANIZED HEALTH CARE EDUCATION/TRAINING PROGRAM

## 2024-02-19 PROCEDURE — 250N000013 HC RX MED GY IP 250 OP 250 PS 637: Performed by: STUDENT IN AN ORGANIZED HEALTH CARE EDUCATION/TRAINING PROGRAM

## 2024-02-19 PROCEDURE — 36415 COLL VENOUS BLD VENIPUNCTURE: CPT | Performed by: STUDENT IN AN ORGANIZED HEALTH CARE EDUCATION/TRAINING PROGRAM

## 2024-02-19 PROCEDURE — 999N000111 HC STATISTIC OT IP EVAL DEFER

## 2024-02-19 PROCEDURE — 250N000011 HC RX IP 250 OP 636: Performed by: STUDENT IN AN ORGANIZED HEALTH CARE EDUCATION/TRAINING PROGRAM

## 2024-02-19 PROCEDURE — 82565 ASSAY OF CREATININE: CPT | Performed by: STUDENT IN AN ORGANIZED HEALTH CARE EDUCATION/TRAINING PROGRAM

## 2024-02-19 PROCEDURE — 82374 ASSAY BLOOD CARBON DIOXIDE: CPT | Performed by: STUDENT IN AN ORGANIZED HEALTH CARE EDUCATION/TRAINING PROGRAM

## 2024-02-19 PROCEDURE — 250N000013 HC RX MED GY IP 250 OP 250 PS 637

## 2024-02-19 PROCEDURE — 85027 COMPLETE CBC AUTOMATED: CPT | Performed by: STUDENT IN AN ORGANIZED HEALTH CARE EDUCATION/TRAINING PROGRAM

## 2024-02-19 RX ORDER — METRONIDAZOLE 500 MG/1
500 TABLET ORAL 3 TIMES DAILY
Qty: 21 TABLET | Refills: 0 | Status: SHIPPED | OUTPATIENT
Start: 2024-02-19 | End: 2024-02-26

## 2024-02-19 RX ORDER — AMOXICILLIN 250 MG
1 CAPSULE ORAL 2 TIMES DAILY PRN
Qty: 10 TABLET | Refills: 0 | Status: SHIPPED | OUTPATIENT
Start: 2024-02-19

## 2024-02-19 RX ORDER — ONDANSETRON 4 MG/1
4 TABLET, ORALLY DISINTEGRATING ORAL EVERY 6 HOURS PRN
Qty: 6 TABLET | Refills: 0 | Status: SHIPPED | OUTPATIENT
Start: 2024-02-19

## 2024-02-19 RX ORDER — IBUPROFEN 600 MG/1
600 TABLET, FILM COATED ORAL EVERY 6 HOURS PRN
COMMUNITY
Start: 2024-02-19

## 2024-02-19 RX ORDER — OXYCODONE HYDROCHLORIDE 5 MG/1
5-10 TABLET ORAL EVERY 4 HOURS PRN
Qty: 20 TABLET | Refills: 0 | Status: SHIPPED | OUTPATIENT
Start: 2024-02-19

## 2024-02-19 RX ORDER — ACETAMINOPHEN 325 MG/1
650 TABLET ORAL EVERY 4 HOURS PRN
COMMUNITY
Start: 2024-02-21

## 2024-02-19 RX ORDER — CEFUROXIME AXETIL 500 MG/1
500 TABLET ORAL 2 TIMES DAILY
Qty: 14 TABLET | Refills: 0 | Status: SHIPPED | OUTPATIENT
Start: 2024-02-19 | End: 2024-02-26

## 2024-02-19 RX ADMIN — ACETAMINOPHEN 975 MG: 325 TABLET, FILM COATED ORAL at 04:12

## 2024-02-19 RX ADMIN — METRONIDAZOLE 500 MG: 500 INJECTION, SOLUTION INTRAVENOUS at 11:40

## 2024-02-19 RX ADMIN — CLONAZEPAM 2 MG: 0.5 TABLET ORAL at 09:23

## 2024-02-19 RX ADMIN — DILTIAZEM HYDROCHLORIDE 240 MG: 120 CAPSULE, COATED, EXTENDED RELEASE ORAL at 09:23

## 2024-02-19 RX ADMIN — ESCITALOPRAM OXALATE 10 MG: 10 TABLET ORAL at 09:23

## 2024-02-19 RX ADMIN — METRONIDAZOLE 500 MG: 500 INJECTION, SOLUTION INTRAVENOUS at 04:13

## 2024-02-19 RX ADMIN — LAMOTRIGINE 100 MG: 100 TABLET ORAL at 09:23

## 2024-02-19 ASSESSMENT — ACTIVITIES OF DAILY LIVING (ADL)
ADLS_ACUITY_SCORE: 22

## 2024-02-19 NOTE — PLAN OF CARE
Please see flowsheets for detailed vital signs and assessments.   Neuro: A&O x 4.  Vital Signs: Temp: 98.1  F (36.7  C) Temp src: Temporal BP: (!) 124/90 Pulse: 62   Resp: 12 SpO2: 94 % O2 Device: None (Room air) Oxygen Delivery: 7 LPM  Pain: PRN oral Dilaudid given for pain. Pt received block during I&D.  CMS: Intact   GI/: voiding w/o difficulty   Skin: Coban wrap on right hand and index finger. C/D/I  Activity: Independent   Diet: Tolerating regular diet  Plan: Pain management. Continue with IV abx. Possible discharge to home tomorrow.

## 2024-02-19 NOTE — PROGRESS NOTES
Orthopedic Surgery  Tonny Giordano  02/19/2024     Admit Date:  2/17/2024    POD: 1 Day Post-Op   Procedure(s):  Right index finger irrigation and excisional debridement of skin, subcutaneous tissue, fascia, flexor tendon.     Patient resting comfortably.    Pain improving in finger. States he is able to move the finger more comfortably.   Tolerating oral intake.    Denies nausea or vomiting  Denies chest pain or shortness of breath    Temp:  [96.9  F (36.1  C)-98.1  F (36.7  C)] 97  F (36.1  C)  Pulse:  [61-72] 61  Resp:  [6-20] 10  BP: (114-147)/() 114/78  SpO2:  [92 %-100 %] 92 %    Alert and oriented  Dressing is clean, dry, and intact.   Reports sensation to light touch   Improved pain with slight motion in dressings.     Labs:  Recent Labs   Lab Test 02/19/24  0717 02/18/24  0707 02/17/24  2106   WBC 9.2 6.3 7.0   HGB 15.8 15.3 15.2    174 185     Plan:     Receive additional dose of flagyl IV and then can change to oral abx.     Ambulatory for DVT prophylaxis.     Weightbearing and finger motion as tolerated    May remove bandage after 3 days and redress with bandaids. No soaking wound. May get wet and let water run off of it    Discharge to home today after last Abx dose on oral Abx. (Appreciate medicine input for outpatient abx recommendation as patient has a PCN allergy).      FOLLOWUP:     Dr. Roger Posadas's clinic in 2 weeks.     Gris Farah PA-C

## 2024-02-19 NOTE — PLAN OF CARE
Goal Outcome Evaluation:      Plan of Care Reviewed With: patient    Overall Patient Progress: no changeOverall Patient Progress: no change       Pt up ind in room. Vss on room air. Dressing CDI. Weak  in right hand still. Pain managed with scheduled tylenol, and PRN oxy. Tolerating reg diet. IV SL between abx. Plan TBD, will continue with plan of care.

## 2024-02-19 NOTE — PLAN OF CARE
Afebrile.  Mild tolerable pain managed with  sched. Tylenol, encouraged cold pack & elevation.  No nausea.  LS clear bilat., RA.  CMS+ except mild numbness R upper arm from block still.  Drsg CDI.  Up ad simone, ambulating.  Voiding.  Reviewed DC instructions with patient.  Patient discharged to home with discharge instructions & personal belongings.  Pt will  DC filled meds directly from [facility].

## 2024-02-19 NOTE — DISCHARGE SUMMARY
[facility]  Hospitalist Discharge Summary      Date of Admission:  2/17/2024  Date of Discharge:  2/19/2024 12:29 PM  Discharging Provider: Alejandro Caldwell DO  Discharge Service: Hospitalist Service    Discharge Diagnoses   Tonny Giordano is a 44 year old male admitted with PMH significant for SYED/MDD, PTSD, bicuspic aortic valve, thoracic aortic aneurysm who was admitted on 2/17/2024 with worsening cat bite in the right index finger.  Accident happened on February 14, he has been taking antibiotic at home but pain and swelling are progressing.  He has been seen by orthopedic surgeon tonight who suspect early tenosynovitis and is requesting admission for I&D tomorrow.  In the emergency department he received Rocephin and vancomycin.    Ortho was consulted and the patient underwent I&D on 2/18.  He has done well in the post-operative period.  He will discharge to complete 7 more days of cefuroxime and flagyl.        Right index finger cat bit infection with suspected underlying tenosynovitis.  Vitally stable.  Reportedly taking doxycycline and metronidazole as outpatient and failed to improve.  Did have some discharge from the would a couple days ago but none recently.  7/10 pain.  No pain, swelling, or erythema extending past the PIP joint.  Ortho was consulted and the patient underwent I&D on 2/18.  He has done well in the post-operative period.  He will discharge to complete 7 more days of cefuroxime and flagyl after discussion with ortho.  Of note, he has a penicillin allergy listed but this was quite remote and he is not sure what his response was ? Nausea.  I encouraged him to speak with an allergist to determine if this is a true allergy.      Other active chronic comorbidities.  PTSD/Anxiety/depression: I have resumed PTA lexapro, lamictal, clonazepam     Hiatal hernia.  Bicuspid aortic valve.  Thoracic aortic aneurysm.    Clinically Significant Risk Factors          Follow-ups Needed  After Discharge   Follow-up Appointments     Follow-up and recommended labs and tests       Follow-up with Dr. Roger Posadas team at [facility]   approximately 2 weeks following your surgery.  Call the care coordinator   at [phone] to arrange appointment or if any questions.    [facility] clinic numbers:  Annie [phone], Betzy [phone], Louie   [phone]  Walk in clinic hours: 8 am - 8 pm        Follow-up and recommended labs and tests       Follow up with primary care provider, [facility], within   7 days for hospital follow- up.  No follow up labs or test are needed.            Unresulted Labs Ordered in the Past 30 Days of this Admission       No orders found from 1/18/2024 to 2/18/2024.        These results will be followed up by NA    Discharge Disposition   Discharged to home  Condition at discharge: Good    Consultations This Hospital Stay   PHARMACY TO [facility]  ORTHOPEDIC SURGERY IP CONSULT  OCCUPATIONAL THERAPY ADULT IP CONSULT  PHYSICAL THERAPY ADULT IP CONSULT    Code Status   Full Code    Time Spent on this Encounter   I, Alejandro Caldwell DO, personally saw the patient today and spent greater than 30 minutes discharging this patient.       Alejandro Caldwell DO  [facility]  [address] [facility] [address]  Phone: [phone]  Fax: [phone]  ______________________________________________________________________    Physical Exam   Vital Signs: Temp: 97  F (36.1  C) Temp src: Temporal BP: 114/78 Pulse: 61   Resp: 16 SpO2: 96 % O2 Device: None (Room air) Oxygen Delivery: 7 LPM  Weight: 194 lbs 0 oz    General Appearance:     Awake. Alert.  NAD.  Well appearing.  Respiratory: CTAB. Normal WOB on RA.  Cardiovascular: RRR no mrg.  No peripheral edema.  GI: Benign, soft.   Skin: Right arm is warm and well perfused.  The wound is wrapped in dressing.    Other:   Appropriate.  Pleasant.         Primary Care Physician   Myriam  [facility]    Discharge Orders      Reason for your hospital stay    Right index finger I&D     Follow-up and recommended labs and tests     Follow-up with Dr. Roger Posadas team at [facility] approximately 2 weeks following your surgery.  Call the care coordinator at [phone] to arrange appointment or if any questions.    [facility] clinic numbers:  Annie [phone], Betzy [phone], Louie [phone]  Walk in clinic hours: 8 am - 8 pm     Activity    Your activity upon discharge: ROMAT, avoid heavy lifting/repetitive use with right index finger     When to contact your care team    Call if increasing pain, redness, drainage, cramping, fever >101.     Wound care and dressings    Instructions to care for your wound at home: Site: right finger   Keep dressings clean, dry and intact x 3 days.  Okay to remove dressings on POD #3.     Discharge Instructions    No driving while taking narcotics.     Reason for your hospital stay    You were hospitalized for a cat bite infection.  You were treated with IV antibiotics and surgery to clean the infection wound.  You will need to complete 7 more days of oral antibiotics.     Follow-up and recommended labs and tests     Follow up with primary care provider, [facility], within 7 days for hospital follow- up.  No follow up labs or test are needed.     Diet    Follow this diet upon discharge:       Advance Diet as Tolerated: Regular Diet Adult       Significant Results and Procedures   Most Recent 3 CBC's:  Recent Labs   Lab Test 02/19/24  0717 02/18/24  0707 02/17/24  2106   WBC 9.2 6.3 7.0   HGB 15.8 15.3 15.2   MCV 99 99 98    174 185     Most Recent 3 BMP's:  Recent Labs   Lab Test 02/19/24  0717 02/18/24  0707 02/17/24  2106   * 138 136   POTASSIUM 4.5 4.2 3.9   CHLORIDE 101 103 99   CO2 24 27 27   BUN 9.7 15.3 12.6   CR 0.92 1.12 1.09   ANIONGAP 9 8 10   LIANA 8.9 9.1 9.5   * 93 92       Discharge  Medications   Current Discharge Medication List        START taking these medications    Details   acetaminophen (TYLENOL) 325 MG tablet Take 2 tablets (650 mg) by mouth every 4 hours as needed for other (For optimal non-opioid multimodal pain management to improve pain control.)    Associated Diagnoses: Flexor tenosynovitis of finger      cefuroxime (CEFTIN) 500 MG tablet Take 1 tablet (500 mg) by mouth 2 times daily for 7 days  Qty: 14 tablet, Refills: 0    Associated Diagnoses: Flexor tenosynovitis of finger      ibuprofen (ADVIL/MOTRIN) 600 MG tablet Take 1 tablet (600 mg) by mouth every 6 hours as needed for other (inflammatory pain)    Associated Diagnoses: Flexor tenosynovitis of finger      metroNIDAZOLE (FLAGYL) 500 MG tablet Take 1 tablet (500 mg) by mouth 3 times daily for 7 days  Qty: 21 tablet, Refills: 0    Associated Diagnoses: Flexor tenosynovitis of finger      ondansetron (ZOFRAN ODT) 4 MG ODT tab Take 1 tablet (4 mg) by mouth every 6 hours as needed for nausea or vomiting  Qty: 6 tablet, Refills: 0    Associated Diagnoses: Flexor tenosynovitis of finger      oxyCODONE (ROXICODONE) 5 MG tablet Take 1-2 tablets (5-10 mg) by mouth every 4 hours as needed for moderate pain  Qty: 20 tablet, Refills: 0    Associated Diagnoses: Flexor tenosynovitis of finger      senna-docusate (SENOKOT-S/PERICOLACE) 8.6-50 MG tablet Take 1 tablet by mouth 2 times daily as needed for constipation  Qty: 10 tablet, Refills: 0    Associated Diagnoses: Flexor tenosynovitis of finger           CONTINUE these medications which have NOT CHANGED    Details   clonazePAM (KLONOPIN) 2 MG tablet Take 2 mg by mouth 2 times daily      cyclobenzaprine (FLEXERIL) 10 MG tablet Take 10 mg by mouth 2 times daily as needed for muscle spasms      diltiazem ER COATED BEADS (CARDIZEM CD/CARTIA XT) 240 MG 24 hr capsule Take 240 mg by mouth daily      !! escitalopram (LEXAPRO) 20 MG tablet Take 20 mg by mouth at bedtime      !! escitalopram  (LEXAPRO) 20 MG tablet Take 10 mg by mouth every morning      !! lamoTRIgine (LAMICTAL) 100 MG tablet Take 100 mg by mouth every morning      !! lamoTRIgine (LAMICTAL) 100 MG tablet Take 100 mg by mouth at bedtime      metoprolol tartrate (LOPRESSOR) 25 MG tablet Take 25 mg by mouth as needed (per PCP directions)      sildenafil (VIAGRA) 25 MG tablet Take  mg by mouth daily as needed TAKE 2 TO 4 TABLETS BY MOUTH 1 HOUR PRIOR TO INTERCOURS      FINASTERIDE PO        !! - Potential duplicate medications found. Please discuss with provider.        Allergies   Allergies   Allergen Reactions    Compazine [Prochlorperazine] Other (See Comments)     Muscle spasm    Penicillins

## 2024-02-19 NOTE — PROGRESS NOTES
Occupational Therapy: Orders received. Chart reviewed and discussed with care team.? Occupational Therapy not indicated due to pt indp in room, able to demo all ADL and self care needed, educated pt on OP hand therapy if needed and how to access through OP ortho at follow up, ed on ROM and WBAT.? Defer discharge recommendations to care team.? Will complete orders.

## 2024-06-09 ENCOUNTER — TELEPHONE (OUTPATIENT)
Dept: NURSING | Facility: CLINIC | Age: 44
End: 2024-06-09
Payer: MEDICARE

## 2024-06-09 NOTE — TELEPHONE ENCOUNTER
Triage Call:    Caller: Patient  He took a fall and his knee is swollen up to the size of a balloon.  He was having a lot of pain 9/10 with any steps . He was seen at [facility] urgent care.  He didn't tell him how long he should be having a compression bandage on.    He also has a knee brace to keep everything straight and cause issues.  He is wondering how long.      Advised he would need to speak with [facility] . He stated he was tx to triage line and advised will connect to the answering service to get ahold of the on-call provider.    Warm tx to answering service and they are going to page for him.     Tammy Trevino RN on 6/9/2024 at 11:01 AM

## 2024-06-14 ENCOUNTER — TELEPHONE (OUTPATIENT)
Dept: NURSING | Facility: CLINIC | Age: 44
End: 2024-06-14

## 2024-06-14 ENCOUNTER — HOSPITAL ENCOUNTER (EMERGENCY)
Facility: CLINIC | Age: 44
Discharge: HOME | End: 2024-06-14
Attending: EMERGENCY MEDICINE | Admitting: EMERGENCY MEDICINE
Payer: MEDICARE

## 2024-06-14 VITALS
BODY MASS INDEX: 24.81 KG/M2 | DIASTOLIC BLOOD PRESSURE: 103 MMHG | SYSTOLIC BLOOD PRESSURE: 144 MMHG | WEIGHT: 193.34 LBS | HEIGHT: 74 IN | OXYGEN SATURATION: 97 % | TEMPERATURE: 97.9 F | HEART RATE: 108 BPM | RESPIRATION RATE: 18 BRPM

## 2024-06-14 DIAGNOSIS — R19.7 DIARRHEA, UNSPECIFIED TYPE: ICD-10-CM

## 2024-06-14 LAB
ADV 40+41 DNA STL QL NAA+NON-PROBE: NEGATIVE
ALBUMIN SERPL BCG-MCNC: 4.6 G/DL (ref 3.5–5.2)
ALP SERPL-CCNC: 127 U/L (ref 40–150)
ALT SERPL W P-5'-P-CCNC: 45 U/L (ref 0–70)
ANION GAP SERPL CALCULATED.3IONS-SCNC: 19 MMOL/L (ref 7–15)
AST SERPL W P-5'-P-CCNC: 75 U/L (ref 0–45)
ASTRO TYP 1-8 RNA STL QL NAA+NON-PROBE: NEGATIVE
ATRIAL RATE - MUSE: 108 BPM
BASOPHILS # BLD AUTO: 0.1 10E3/UL (ref 0–0.2)
BASOPHILS NFR BLD AUTO: 1 %
BILIRUB SERPL-MCNC: 0.9 MG/DL
BUN SERPL-MCNC: 13.9 MG/DL (ref 6–20)
C CAYETANENSIS DNA STL QL NAA+NON-PROBE: NEGATIVE
CALCIUM SERPL-MCNC: 10.2 MG/DL (ref 8.6–10)
CAMPYLOBACTER DNA SPEC NAA+PROBE: NEGATIVE
CHLORIDE SERPL-SCNC: 97 MMOL/L (ref 98–107)
CREAT SERPL-MCNC: 0.8 MG/DL (ref 0.67–1.17)
CRYPTOSP DNA STL QL NAA+NON-PROBE: NEGATIVE
DEPRECATED HCO3 PLAS-SCNC: 22 MMOL/L (ref 22–29)
DIASTOLIC BLOOD PRESSURE - MUSE: NORMAL MMHG
E COLI O157 DNA STL QL NAA+NON-PROBE: ABNORMAL
E HISTOLYT DNA STL QL NAA+NON-PROBE: NEGATIVE
EAEC ASTA GENE ISLT QL NAA+PROBE: NEGATIVE
EC STX1+STX2 GENES STL QL NAA+NON-PROBE: NEGATIVE
EGFRCR SERPLBLD CKD-EPI 2021: >90 ML/MIN/1.73M2
EOSINOPHIL # BLD AUTO: 0 10E3/UL (ref 0–0.7)
EOSINOPHIL NFR BLD AUTO: 0 %
EPEC EAE GENE STL QL NAA+NON-PROBE: POSITIVE
ERYTHROCYTE [DISTWIDTH] IN BLOOD BY AUTOMATED COUNT: 12.5 % (ref 10–15)
ETEC LTA+ST1A+ST1B TOX ST NAA+NON-PROBE: NEGATIVE
G LAMBLIA DNA STL QL NAA+NON-PROBE: NEGATIVE
GLUCOSE SERPL-MCNC: 99 MG/DL (ref 70–99)
HCT VFR BLD AUTO: 46.3 % (ref 40–53)
HGB BLD-MCNC: 16 G/DL (ref 13.3–17.7)
HOLD SPECIMEN: NORMAL
HOLD SPECIMEN: NORMAL
IMM GRANULOCYTES # BLD: 0 10E3/UL
IMM GRANULOCYTES NFR BLD: 0 %
INTERPRETATION ECG - MUSE: NORMAL
LYMPHOCYTES # BLD AUTO: 1.2 10E3/UL (ref 0.8–5.3)
LYMPHOCYTES NFR BLD AUTO: 13 %
MCH RBC QN AUTO: 32.6 PG (ref 26.5–33)
MCHC RBC AUTO-ENTMCNC: 34.6 G/DL (ref 31.5–36.5)
MCV RBC AUTO: 94 FL (ref 78–100)
MONOCYTES # BLD AUTO: 0.7 10E3/UL (ref 0–1.3)
MONOCYTES NFR BLD AUTO: 7 %
NEUTROPHILS # BLD AUTO: 7.2 10E3/UL (ref 1.6–8.3)
NEUTROPHILS NFR BLD AUTO: 79 %
NOROVIRUS GI+II RNA STL QL NAA+NON-PROBE: NEGATIVE
NRBC # BLD AUTO: 0 10E3/UL
NRBC BLD AUTO-RTO: 0 /100
P AXIS - MUSE: 65 DEGREES
P SHIGELLOIDES DNA STL QL NAA+NON-PROBE: NEGATIVE
PLATELET # BLD AUTO: 194 10E3/UL (ref 150–450)
POTASSIUM SERPL-SCNC: 4.3 MMOL/L (ref 3.4–5.3)
PR INTERVAL - MUSE: 192 MS
PROT SERPL-MCNC: 7.6 G/DL (ref 6.4–8.3)
QRS DURATION - MUSE: 108 MS
QT - MUSE: 358 MS
QTC - MUSE: 479 MS
R AXIS - MUSE: -68 DEGREES
RBC # BLD AUTO: 4.91 10E6/UL (ref 4.4–5.9)
RVA RNA STL QL NAA+NON-PROBE: NEGATIVE
SALMONELLA SP RPOD STL QL NAA+PROBE: NEGATIVE
SAPO I+II+IV+V RNA STL QL NAA+NON-PROBE: NEGATIVE
SHIGELLA SP+EIEC IPAH ST NAA+NON-PROBE: NEGATIVE
SODIUM SERPL-SCNC: 138 MMOL/L (ref 135–145)
SYSTOLIC BLOOD PRESSURE - MUSE: NORMAL MMHG
T AXIS - MUSE: 47 DEGREES
V CHOLERAE DNA SPEC QL NAA+PROBE: NEGATIVE
VENTRICULAR RATE- MUSE: 108 BPM
VIBRIO DNA SPEC NAA+PROBE: NEGATIVE
WBC # BLD AUTO: 9.1 10E3/UL (ref 4–11)
Y ENTEROCOL DNA STL QL NAA+PROBE: NEGATIVE

## 2024-06-14 PROCEDURE — 87507 IADNA-DNA/RNA PROBE TQ 12-25: CPT | Mod: GZ | Performed by: EMERGENCY MEDICINE

## 2024-06-14 PROCEDURE — 80053 COMPREHEN METABOLIC PANEL: CPT | Performed by: EMERGENCY MEDICINE

## 2024-06-14 PROCEDURE — 93005 ELECTROCARDIOGRAM TRACING: CPT

## 2024-06-14 PROCEDURE — 99284 EMERGENCY DEPT VISIT MOD MDM: CPT

## 2024-06-14 PROCEDURE — 85025 COMPLETE CBC W/AUTO DIFF WBC: CPT | Performed by: EMERGENCY MEDICINE

## 2024-06-14 PROCEDURE — 36415 COLL VENOUS BLD VENIPUNCTURE: CPT | Performed by: EMERGENCY MEDICINE

## 2024-06-14 ASSESSMENT — COLUMBIA-SUICIDE SEVERITY RATING SCALE - C-SSRS
2. HAVE YOU ACTUALLY HAD ANY THOUGHTS OF KILLING YOURSELF IN THE PAST MONTH?: NO
6. HAVE YOU EVER DONE ANYTHING, STARTED TO DO ANYTHING, OR PREPARED TO DO ANYTHING TO END YOUR LIFE?: NO
1. IN THE PAST MONTH, HAVE YOU WISHED YOU WERE DEAD OR WISHED YOU COULD GO TO SLEEP AND NOT WAKE UP?: NO

## 2024-06-14 ASSESSMENT — ACTIVITIES OF DAILY LIVING (ADL)
ADLS_ACUITY_SCORE: 35
ADLS_ACUITY_SCORE: 35

## 2024-06-14 NOTE — TELEPHONE ENCOUNTER
Patient calling for results interpretation of his liver panel and wondering if his results mean he may have Hepatitis. Advised patient to follow up with his PCP for result interpretation. Patient verbalized understanding.     SPENSER SARAVIA RN

## 2024-06-14 NOTE — ED TRIAGE NOTES
"Presents to ED with \"months\" of diarrhea. States he wakes up nauseous in the morning and has had frequent vomiting. Pt states today he had a \"very dark brown\" episode of vomiting. Pt also reports severe acid reflux. Pt tachycardic and tremorous. Pt reports daily alcohol use, last use last night. Pt states girlfriend born with Hep B and wants liver levels checked. A&OX4.      Triage Assessment (Adult)       Row Name 06/14/24 1010          Triage Assessment    Airway WDL WDL        Respiratory WDL    Respiratory WDL WDL        Skin Circulation/Temperature WDL    Skin Circulation/Temperature WDL WDL        Cardiac WDL    Cardiac WDL X;rhythm     Pulse Rate & Regularity tachycardic        Peripheral/Neurovascular WDL    Peripheral Neurovascular WDL WDL        Cognitive/Neuro/Behavioral WDL    Cognitive/Neuro/Behavioral WDL WDL                     "

## 2024-06-14 NOTE — TELEPHONE ENCOUNTER
[facility]    Reason for call: Lab Result Notification     Lab Result (including Rx patient on, if applicable).  If culture, copy of lab report at bottom.  Lab Result:   Component      Latest Ref Rng 6/14/2024  11:12 AM   Enteropathogenic E. coli (EPEC)      Negative, NA  Positive !       Legend:  ! Abnormal    Creatinine Level (mg/dl)   Creatinine   Date Value Ref Range Status   06/14/2024 0.80 0.67 - 1.17 mg/dL Final   10/21/2014 0.92 0.66 - 1.25 mg/dL Final    Creatinine clearance (ml/min), if applicable    Serum creatinine: 0.8 mg/dL 06/14/24 1049  Estimated creatinine clearance: 146.2 mL/min     RN Recommendations/Instructions per [facility] ED lab result protocol:   [facility] ED lab result protocol utilized: Enteric bacteria      Patient's current Symptoms:   Patient states the vomiting is better but is still having diarrhea.  States it is not worse. Denies any new or worsening symptoms.     Patient/care giver notified to contact your PCP clinic or return to the Emergency department if your:  Symptoms worsen or other concerning symptoms.       SPENSER SARAVIA, RN

## 2024-06-14 NOTE — ED PROVIDER NOTES
"  Emergency Department Note      History of Present Illness     Chief Complaint  Diarrhea    HPI  Tonny Giordano is a 44 year old male with a history of atrial fibrillation, anxiety, and somatization disorder who presents with diarrhea. Over the past couple of months, he has been experiencing intermittent bouts of nausea, vomiting, and diarrhea in the morning. He notes that his symptoms will sometimes improve taking Zofran, and never saw a doctor for this during that time frame. Then earlier this morning, he woke up feeling nauseas again and had another episode of vomiting, but his emesis appeared dark brown and there was specks of blood in the toilet bowel. At bedside, he endorses being on antibiotics several months ago for a cat bite. Additionally, he mentions that his girlfriend was born with Hepatitis B, and he would like to have his liver levels checked. He drinks alcohol on a daily basis, and his last drink was last night per the Triage note. He denies having a fever.     Independent Historian  None    Review of External Notes  None    Past Medical History   Medical History and Problem List  Anxiety   Depressive disorder   Somatization disorder   Panic disorder w/o agoraphobia  Atrial flutter   Thoracic aortic aneurysm   Afib w/ RVR  PTSD   Chronic depression   R breast mass     Medications  Clonazepam    Cyclobenzaprine   Cardizem CD  Escitalopram   Finasteride   Lamotrigine    Lopressor    Ondansetron   Oxycodone    Sildenafil     Surgical History   [place] teeth extraction     Physical Exam   Patient Vitals for the past 24 hrs:   BP Temp Temp src Pulse Resp SpO2 Height Weight   06/14/24 1008 (!) 155/95 97.9  F (36.6  C) Temporal (!) 122 20 97 % 1.88 m (6' 2\") 87.7 kg (193 lb 5.5 oz)     Physical Exam  General: Patient is alert and cooperative.  HENT:  Normal nose, oropharynx. Moist oral mucosa.  Eyes: EOMI. Normal conjunctiva.  Neck:  Normal range of motion and appearance.   Cardiovascular:  tachycardic rate, " regular.   Pulmonary/Chest:  Effort normal. No wheezing or crackles.  Abdominal: Soft. No distension or tenderness.  Benign.   Musculoskeletal: Normal range of motion. No edema or tenderness.   Neurological: oriented, normal strength, sensation, and coordination.   Skin: Warm and dry. No rash or bruising.   Psychiatric: anxious mood and affect.     Diagnostics   Lab Results   Labs Ordered and Resulted from Time of ED Arrival to Time of ED Departure   COMPREHENSIVE METABOLIC PANEL - Abnormal       Result Value    Sodium 138      Potassium 4.3      Carbon Dioxide (CO2) 22      Anion Gap 19 (*)     Urea Nitrogen 13.9      Creatinine 0.80      GFR Estimate >90      Calcium 10.2 (*)     Chloride 97 (*)     Glucose 99      Alkaline Phosphatase 127      AST 75 (*)     ALT 45      Protein Total 7.6      Albumin 4.6      Bilirubin Total 0.9     CBC WITH PLATELETS AND DIFFERENTIAL    WBC Count 9.1      RBC Count 4.91      Hemoglobin 16.0      Hematocrit 46.3      MCV 94      MCH 32.6      MCHC 34.6      RDW 12.5      Platelet Count 194      % Neutrophils 79      % Lymphocytes 13      % Monocytes 7      % Eosinophils 0      % Basophils 1      % Immature Granulocytes 0      NRBCs per 100 WBC 0      Absolute Neutrophils 7.2      Absolute Lymphocytes 1.2      Absolute Monocytes 0.7      Absolute Eosinophils 0.0      Absolute Basophils 0.1      Absolute Immature Granulocytes 0.0      Absolute NRBCs 0.0     ENTERIC BACTERIA AND VIRUS PANEL BY PCR     Imaging  No orders to display     Independent Interpretation  None    ED Course    Medications Administered  None     Procedures  None     Discussion of Management  None    Social Determinants of Health adding to complexity of care  None    ED Course  ED Course as of 06/14/24 1213   Fri Jun 14, 2024   1028 I obtained history and examined the patient as noted above.   1205 I rechecked the patient and explained findings. I discussed plan for discharge home.     Medical Decision Making /  Diagnosis   CMS Diagnoses: None    MIPS     None    MDM  Tonny Giordano is a 44 year old male with n/v/d.  Not daily, but frequent, over past couple month.  No fever, foreign travel, ill contacts.  Concern for flecks of blood in emesis; none in stool.  Benign abdomen.  Not anticoagulated.  Cbc wnl, cmp minimal elevated AST, other lft's unremarkable.  Admits to daily etoh, a likely etiology for isolated ast elevation.  No indication for empiric antibiotics, advised stool panel, follow up with , will be contacted if any need for antibiotics/treatment.  Does have gerd, on omeprazole, worsening symptoms;   this together with n/v/d may warrant follow up with GI as well.     Disposition  The patient was discharged.     ICD-10 Codes:    ICD-10-CM    1. Diarrhea, unspecified type  R19.7            Scribe Disclosure:  ANGEL, Leon Doshi, am serving as a scribe at 11:19 AM on 6/14/2024 to document services personally performed by Taz Vaughan MD based on my observations and the provider's statements to me.        Taz Vaughan MD  06/14/24 7520

## 2024-08-11 ENCOUNTER — HEALTH MAINTENANCE LETTER (OUTPATIENT)
Age: 44
End: 2024-08-11

## 2025-05-22 ENCOUNTER — OFFICE VISIT (OUTPATIENT)
Dept: URGENT CARE | Facility: URGENT CARE | Age: 45
End: 2025-05-22
Payer: MEDICARE

## 2025-05-22 VITALS
HEART RATE: 75 BPM | WEIGHT: 180 LBS | RESPIRATION RATE: 18 BRPM | OXYGEN SATURATION: 95 % | DIASTOLIC BLOOD PRESSURE: 86 MMHG | TEMPERATURE: 97.8 F | SYSTOLIC BLOOD PRESSURE: 130 MMHG | BODY MASS INDEX: 23.11 KG/M2

## 2025-05-22 DIAGNOSIS — S00.81XA CAT SCRATCH OF FACE, INITIAL ENCOUNTER: Primary | ICD-10-CM
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
## 2025-05-22 NOTE — PROGRESS NOTES
SUBJECTIVE:  Tonny Giordano is a 45 year old male comes in with concerns for cat scratch on the right side of his face that occurred earlier today.  Patient states that he was taking a nap in bed when he had an alarm set to wake him for time to feed his cats.  States that the cat was running around and his claw caught the right side of his face near his eye and he sustained a scratch.  Does not believe that it hit his eye.  He has no water in the eye no vision changes or pain in the eye.  Did clean with water and put antibacterial ointment on it.  He is concerned for infection occurring as 1 year ago he had a cat bite to his finger that resulted in tenosynovitis and requiring surgery.  He does have underlying significant anxiety with panic attacks especially related to medical issues.  His tetanus is current in 2023.  He believes the cats are up-to-date.  They are inside cats exclusively.    Past Medical History:   Diagnosis Date    Anxiety     Depressive disorder      Patient Active Problem List   Diagnosis    Flexor tenosynovitis of finger     Current Outpatient Medications   Medication Sig Dispense Refill    acetaminophen (TYLENOL) 325 MG tablet Take 2 tablets (650 mg) by mouth every 4 hours as needed for other (For optimal non-opioid multimodal pain management to improve pain control.)      clonazePAM (KLONOPIN) 2 MG tablet Take 2 mg by mouth 2 times daily      cyclobenzaprine (FLEXERIL) 10 MG tablet Take 10 mg by mouth 2 times daily as needed for muscle spasms      diltiazem ER COATED BEADS (CARDIZEM CD/CARTIA XT) 240 MG 24 hr capsule Take 240 mg by mouth daily      escitalopram (LEXAPRO) 20 MG tablet Take 20 mg by mouth at bedtime      escitalopram (LEXAPRO) 20 MG tablet Take 10 mg by mouth every morning      FINASTERIDE PO       ibuprofen (ADVIL/MOTRIN) 600 MG tablet Take 1 tablet (600 mg) by mouth every 6 hours as needed for other (inflammatory pain)      lamoTRIgine (LAMICTAL) 100 MG tablet Take 100 mg by  mouth every morning      lamoTRIgine (LAMICTAL) 100 MG tablet Take 100 mg by mouth at bedtime      metoprolol tartrate (LOPRESSOR) 25 MG tablet Take 25 mg by mouth as needed (per PCP directions)      ondansetron (ZOFRAN ODT) 4 MG ODT tab Take 1 tablet (4 mg) by mouth every 6 hours as needed for nausea or vomiting 6 tablet 0    oxyCODONE (ROXICODONE) 5 MG tablet Take 1-2 tablets (5-10 mg) by mouth every 4 hours as needed for moderate pain 20 tablet 0    senna-docusate (SENOKOT-S/PERICOLACE) 8.6-50 MG tablet Take 1 tablet by mouth 2 times daily as needed for constipation 10 tablet 0    sildenafil (VIAGRA) 25 MG tablet Take  mg by mouth daily as needed TAKE 2 TO 4 TABLETS BY MOUTH 1 HOUR PRIOR TO INTERCOURS       No current facility-administered medications for this visit.     Social History     Socioeconomic History    Marital status: Single     Spouse name: Not on file    Number of children: Not on file    Years of education: Not on file    Highest education level: Not on file   Occupational History    Not on file   Tobacco Use    Smoking status: Every Day     Types: Cigarettes    Smokeless tobacco: Not on file    Tobacco comments:     Last cig 2/17   Substance and Sexual Activity    Alcohol use: Not on file    Drug use: Yes     Types: Marijuana     Comment: occasional marijuana (smoked) use 2/17    Sexual activity: Not on file   Other Topics Concern    Not on file   Social History Narrative    Not on file     Social Drivers of Health     Financial Resource Strain: Not on file   Food Insecurity: Not on file   Transportation Needs: Not on file   Physical Activity: Not on file   Stress: Not on file   Social Connections: Not on file   Interpersonal Safety: Not on file   Housing Stability: Not on file     ROS  negative other than stated above    Exam:  GENERAL APPEARANCE: healthy, alert and no distress  EYES: Eyes grossly normal to inspection and no erythema watering noted.  No corneal abrasion  RESP: lungs clear  to auscultation - no rales, rhonchi or wheezes  CV: regular rates and rhythm, normal S1 S2, no S3 or S4 and no murmur, click or rub -  SKIN: Superficial scratch to the right side of his face just lateral to the right eye.  There is no puncture wound.  No drainage or signs of infection.  No deep wounds.    assessment/plan:  (S00.81XA,  W55.03XA) Cat scratch of face, initial encounter  (primary encounter diagnosis)  Comment:   Plan: amoxicillin-clavulanate (AUGMENTIN) 875-125 MG         tablet        Cat scratch to the right side of his face from his cat's claw.  No corneal abrasion or significant trauma is noted.  Wound was already cleaned with bacitracin applied.  Due to a scratch she does not technically need preventative dosing but he is very concerned due to medical anxiety along with situation that required surgery from the previous cat bite.  Discussed treatment and he would like to do preventative dosing.  Augmentin as directed.  Patient does have penicillin listed as an allergy but states that he is taken recently x 2 and has had no issues and would like to take the Augmentin as directed.  Side effects of medication were reviewed and he is aware to take with food along with probiotic.  Will continue to use soap and water for cleaning along with topical antibiotic ointment.  Red flag signs were discussed will return as needed tetanus is current at this time.

## 2025-05-22 NOTE — PROGRESS NOTES
Urgent Care Clinic Visit    Chief Complaint   Patient presents with    Urgent Care     Cat Scratched in the face today. Right eye. Is painful. Wanting to rule out any major injuries/infection. Cleaned with water and antibacterial ointment.                5/22/2025     5:46 PM   Additional Questions   Roomed by Germania   Accompanied by Self

## 2025-05-22 NOTE — PROGRESS NOTES
Urgent Care Clinic Visit    No chief complaint on file.              5/22/2025     5:46 PM   Additional Questions   Roomed by Germania   Accompanied by Self

## (undated) DEVICE — BNDG KLING 2" 2231

## (undated) DEVICE — TOURNIQUET SGL BLADDER 18"X4" RED 5921-218-135

## (undated) DEVICE — SU ETHILON 4-0 PS-2 18" BLACK 1667H

## (undated) DEVICE — ESU GROUND PAD ADULT W/CORD E7507

## (undated) DEVICE — GLOVE BIOGEL PI MICRO SZ 6.0 48560

## (undated) DEVICE — LINEN HALF SHEET 5512

## (undated) DEVICE — BAG CLEAR TRASH 1.3M 39X33" P4040C

## (undated) DEVICE — BRUSH SURGICAL SCRUB W/4% CHG SOL 25ML 371073

## (undated) DEVICE — PACK HAND SOP32HARMO

## (undated) DEVICE — DRSG KERLIX 4 1/2"X4YDS ROLL 6730

## (undated) DEVICE — DRSG XEROFORM 1X8"

## (undated) DEVICE — GLOVE BIOGEL PI MICRO INDICATOR UNDERGLOVE SZ 7.5 48975

## (undated) DEVICE — GLOVE BIOGEL PI MICRO INDICATOR UNDERGLOVE SZ 6.5 48965

## (undated) DEVICE — LINEN FULL SHEET 5511

## (undated) DEVICE — GLOVE BIOGEL PI SZ 7.5 40875

## (undated) DEVICE — APPLICATORS COTTON TIP 6"X2 STERILE LF C15053-006

## (undated) DEVICE — TOURNIQUET TOURNI-COT FINGER GREEN LG  TCL

## (undated) DEVICE — CAST PADDING 4" STERILE 9044S

## (undated) DEVICE — PREP CHLORAPREP 26ML TINTED HI-LITE ORANGE 930815

## (undated) RX ORDER — FENTANYL CITRATE 50 UG/ML
INJECTION, SOLUTION INTRAMUSCULAR; INTRAVENOUS
Status: DISPENSED
Start: 2024-02-18

## (undated) RX ORDER — PROPOFOL 10 MG/ML
INJECTION, EMULSION INTRAVENOUS
Status: DISPENSED
Start: 2024-02-18

## (undated) RX ORDER — ONDANSETRON 2 MG/ML
INJECTION INTRAMUSCULAR; INTRAVENOUS
Status: DISPENSED
Start: 2024-02-18

## (undated) RX ORDER — DEXAMETHASONE SODIUM PHOSPHATE 4 MG/ML
INJECTION, SOLUTION INTRA-ARTICULAR; INTRALESIONAL; INTRAMUSCULAR; INTRAVENOUS; SOFT TISSUE
Status: DISPENSED
Start: 2024-02-18

## (undated) RX ORDER — CEFAZOLIN SODIUM/WATER 2 G/20 ML
SYRINGE (ML) INTRAVENOUS
Status: DISPENSED
Start: 2024-02-18

## (undated) RX ORDER — GLYCOPYRROLATE 0.2 MG/ML
INJECTION, SOLUTION INTRAMUSCULAR; INTRAVENOUS
Status: DISPENSED
Start: 2024-02-18

## (undated) RX ORDER — LIDOCAINE HYDROCHLORIDE 10 MG/ML
INJECTION, SOLUTION EPIDURAL; INFILTRATION; INTRACAUDAL; PERINEURAL
Status: DISPENSED
Start: 2024-02-18